# Patient Record
Sex: MALE | Race: WHITE | NOT HISPANIC OR LATINO | Employment: OTHER | URBAN - METROPOLITAN AREA
[De-identification: names, ages, dates, MRNs, and addresses within clinical notes are randomized per-mention and may not be internally consistent; named-entity substitution may affect disease eponyms.]

---

## 2023-02-15 ENCOUNTER — TRANSCRIBE ORDERS (OUTPATIENT)
Dept: ADMINISTRATIVE | Facility: HOSPITAL | Age: 68
End: 2023-02-15
Payer: MEDICARE

## 2023-02-15 ENCOUNTER — HOSPITAL ENCOUNTER (OUTPATIENT)
Dept: GENERAL RADIOLOGY | Facility: HOSPITAL | Age: 68
Discharge: HOME | End: 2023-02-15
Admitting: RADIOLOGY
Payer: MEDICARE

## 2023-02-15 DIAGNOSIS — H44.609 RETAINED MAGNETIC INTRAOCULAR FOREIGN BODY, UNSPECIFIED LATERALITY: Primary | ICD-10-CM

## 2023-02-15 PROCEDURE — 70140 X-RAY EXAM OF FACIAL BONES: CPT

## 2023-03-13 ENCOUNTER — OFFICE VISIT (OUTPATIENT)
Dept: NEUROSURGERY | Facility: CLINIC | Age: 68
End: 2023-03-13
Payer: MEDICARE

## 2023-03-13 VITALS
DIASTOLIC BLOOD PRESSURE: 84 MMHG | BODY MASS INDEX: 24.05 KG/M2 | SYSTOLIC BLOOD PRESSURE: 130 MMHG | HEIGHT: 71 IN | WEIGHT: 171.8 LBS | TEMPERATURE: 97.1 F

## 2023-03-13 DIAGNOSIS — M43.16 SPONDYLOLISTHESIS OF LUMBAR REGION: ICD-10-CM

## 2023-03-13 DIAGNOSIS — M51.36 DDD (DEGENERATIVE DISC DISEASE), LUMBAR: ICD-10-CM

## 2023-03-13 DIAGNOSIS — R29.2 HYPERREFLEXIA: ICD-10-CM

## 2023-03-13 DIAGNOSIS — M48.062 SPINAL STENOSIS OF LUMBAR REGION WITH NEUROGENIC CLAUDICATION: Primary | ICD-10-CM

## 2023-03-13 PROBLEM — Z12.5 SCREENING FOR PROSTATE CANCER: Status: ACTIVE | Noted: 2023-03-13

## 2023-03-13 PROBLEM — R73.01 IFG (IMPAIRED FASTING GLUCOSE): Status: ACTIVE | Noted: 2023-03-13

## 2023-03-13 PROBLEM — M17.0 OSTEOARTHRITIS OF BOTH KNEES: Status: ACTIVE | Noted: 2023-03-13

## 2023-03-13 PROBLEM — G45.9 TRANSIENT ISCHEMIC ATTACK: Status: ACTIVE | Noted: 2023-03-13

## 2023-03-13 PROBLEM — I25.10 CORONARY ARTERY DISEASE: Status: ACTIVE | Noted: 2023-03-13

## 2023-03-13 PROBLEM — Z87.19 HISTORY OF UPPER GASTROINTESTINAL BLEEDING: Status: ACTIVE | Noted: 2023-03-13

## 2023-03-13 PROBLEM — I10 HYPERTENSION, BENIGN: Status: ACTIVE | Noted: 2023-03-13

## 2023-03-13 PROBLEM — M54.9 BACK PAIN WITH RADIATION: Status: ACTIVE | Noted: 2023-03-13

## 2023-03-13 PROBLEM — K21.9 GASTROESOPHAGEAL REFLUX DISEASE WITHOUT ESOPHAGITIS: Status: ACTIVE | Noted: 2023-03-13

## 2023-03-13 PROBLEM — M19.90 CHRONIC OSTEOARTHRITIS: Status: ACTIVE | Noted: 2023-03-13

## 2023-03-13 PROBLEM — K25.4 CHRONIC GASTRIC ULCER WITH HEMORRHAGE: Status: ACTIVE | Noted: 2023-03-13

## 2023-03-13 PROBLEM — Z72.0 TOBACCO USER: Status: ACTIVE | Noted: 2023-03-13

## 2023-03-13 PROBLEM — R45.4 IRRITABILITY AND ANGER: Status: ACTIVE | Noted: 2023-03-13

## 2023-03-13 PROBLEM — E78.5 HYPERLIPIDEMIA: Status: ACTIVE | Noted: 2023-03-13

## 2023-03-13 PROBLEM — M48.061 LUMBAR SPINAL STENOSIS: Status: ACTIVE | Noted: 2023-03-13

## 2023-03-13 PROBLEM — Z23 NEED FOR VACCINATION AGAINST STREPTOCOCCUS PNEUMONIAE: Status: ACTIVE | Noted: 2023-03-13

## 2023-03-13 PROBLEM — Z86.73 HISTORY OF TIA (TRANSIENT ISCHEMIC ATTACK): Status: ACTIVE | Noted: 2023-03-13

## 2023-03-13 PROCEDURE — 99204 OFFICE O/P NEW MOD 45 MIN: CPT | Performed by: NEUROLOGICAL SURGERY

## 2023-03-13 RX ORDER — EZETIMIBE 10 MG/1
TABLET ORAL
COMMUNITY
Start: 2023-02-25

## 2023-03-13 RX ORDER — ATORVASTATIN CALCIUM 40 MG/1
TABLET, FILM COATED ORAL
COMMUNITY
End: 2023-04-04

## 2023-03-13 RX ORDER — CLOPIDOGREL BISULFATE 75 MG/1
TABLET ORAL
COMMUNITY
Start: 2023-02-25

## 2023-03-13 RX ORDER — ASPIRIN 81 MG/1
TABLET ORAL EVERY 24 HOURS
COMMUNITY

## 2023-03-13 RX ORDER — SENNOSIDES 8.6 MG
CAPSULE ORAL
COMMUNITY
Start: 2023-01-17

## 2023-03-13 RX ORDER — UBIDECARENONE 100 MG
CAPSULE ORAL DAILY
COMMUNITY

## 2023-03-13 RX ORDER — IRBESARTAN 300 MG/1
TABLET ORAL
COMMUNITY
Start: 2005-03-15

## 2023-03-13 NOTE — PROGRESS NOTES
NAME: BRYSON SOSA   DOS: 3/13/2023  : 1955  PCP: Provider, No Known    Chief Complaint:    Chief Complaint   Patient presents with   • Low back & bilateral leg pain       History of Present Illness:  68 y.o. male   Is a 68-year-old male in neurosurgical consultation presents with a longstanding history of back issues he reports about 10 years ago a history of back pain and sciatica that was treated with injections he was told that he was not a surgical candidate    He works for [employer] has moved about and recently been in in [place] for the last 6 years or so most recently his back has been tolerable and he has had intermittent back pain but up until about November or December of this year he had significant worsening of his back pain associated with some left paraspinal discomfort as well as occasional bilateral claudication involving his calves occasional left ankle in his hip joints.  He feels like something is off he denies any bowel bladder continence issues she has a significant coronary disease history and has had a stent placed and is on Plavix and aspirin he has an upcoming trip and is here for evaluation he denies upper extremity symptoms he does occasionally have neck pain    PMHX  Allergies:  No Known Allergies  Medications    Current Outpatient Medications:   •  acetaminophen (TYLENOL) 650 MG 8 hr tablet, Take  by mouth., Disp: , Rfl:   •  irbesartan (AVAPRO) 300 MG tablet, irbesartan 300 mg tablet, Disp: , Rfl:   •  aspirin 81 MG EC tablet, Daily., Disp: , Rfl:   •  atorvastatin (Lipitor) 40 MG tablet, Lipitor 40 mg tablet, Disp: , Rfl:   •  B Complex-Folic Acid (Super B Complex Maxi) tablet, Take  by mouth Daily., Disp: , Rfl:   •  Cholecalciferol 125 MCG (5000 UT) tablet, Take  by mouth Daily., Disp: , Rfl:   •  clopidogrel (PLAVIX) 75 MG tablet, , Disp: , Rfl:   •  coenzyme Q10 100 MG capsule, Take  by mouth Daily., Disp: , Rfl:   •  ezetimibe (ZETIA) 10 MG tablet, , Disp: , Rfl:    •  Glucosamine-Vitamin D (Glucosamine Plus Vitamin D) 1500-200 MG-UNIT tablet, Take  by mouth Daily., Disp: , Rfl:   Past Medical History:  Past Medical History:   Diagnosis Date   • Arthritis 2017   • Clotting disorder (HCC) 2005   • Coronary artery disease 2005   • Hyperlipidemia 2005   • Hypertension 2005   • Infectious viral hepatitis childhood   • Low back pain 2005   • Lumbosacral disc disease 2012   • Pulmonary arterial hypertension (HCC) 2005   • TIA (transient ischemic attack) 2006     Past Surgical History:  Past Surgical History:   Procedure Laterality Date   • CAROTID STENT  2005   • TRIGGER POINT INJECTION  3  steroid shots 2012     Social Hx:  Social History     Tobacco Use   • Smoking status: Former     Types: Cigars   Substance Use Topics   • Alcohol use: Yes     Alcohol/week: 14.0 standard drinks     Types: 14 Cans of beer per week   • Drug use: Never     Family Hx:  Family History   Problem Relation Age of Onset   • Cancer Mother         skin   • Hyperlipidemia Mother    • Hearing loss Father    • Heart disease Father    • Hyperlipidemia Father    • Kidney disease Father      Review of Systems:        Review of Systems   Constitutional: Positive for activity change. Negative for appetite change, chills, diaphoresis, fatigue, fever and unexpected weight change.   HENT: Negative for congestion, dental problem, drooling, ear discharge, ear pain, facial swelling, hearing loss, mouth sores, nosebleeds, postnasal drip, rhinorrhea, sinus pressure, sinus pain, sneezing, sore throat, tinnitus, trouble swallowing and voice change.    Eyes: Negative for photophobia, pain, discharge, redness, itching and visual disturbance.   Respiratory: Negative for apnea, cough, choking, chest tightness, shortness of breath, wheezing and stridor.    Cardiovascular: Negative for chest pain, palpitations and leg swelling.   Gastrointestinal: Negative for abdominal distention, abdominal pain, anal bleeding, blood in stool,  constipation, diarrhea, nausea, rectal pain and vomiting.   Endocrine: Negative for cold intolerance, heat intolerance, polydipsia, polyphagia and polyuria.   Genitourinary: Negative for decreased urine volume, difficulty urinating, dysuria, enuresis, flank pain, frequency, genital sores, hematuria, penile discharge, penile pain, penile swelling, scrotal swelling, testicular pain and urgency.   Musculoskeletal: Positive for back pain. Negative for arthralgias, gait problem, joint swelling, myalgias, neck pain and neck stiffness.   Skin: Negative for color change, pallor, rash and wound.   Allergic/Immunologic: Negative for environmental allergies, food allergies and immunocompromised state.   Neurological: Positive for light-headedness and numbness. Negative for dizziness, tremors, seizures, syncope, facial asymmetry, speech difficulty, weakness and headaches.   Hematological: Negative for adenopathy. Bruises/bleeds easily.   Psychiatric/Behavioral: Negative for agitation, behavioral problems, confusion, decreased concentration, dysphoric mood, hallucinations, self-injury, sleep disturbance and suicidal ideas. The patient is not nervous/anxious and is not hyperactive.         I have reviewed this note template and all pertinent parts of the review of systems social, family history, surgical history and medication list    Physical Examination:  Vitals:    03/13/23 1308   BP: 130/84   Temp: 97.1 °F (36.2 °C)      General Appearance:   Well developed, well nourished, well groomed, alert, and cooperative.  Neurological examination:  Neurologic Exam  Vital signs were reviewed and documented in the chart  Patient appeared in good neurologic function with normal comprehension fluent speech  Mood and affect are normal  Sense of smell deferred  COVID mass left in place    Muscle bulk and tone mostly symmetric he may have a little bit of left quadricep atrophy compared to the right  5 out of 5 strength no motor drift  Gait  normal intact  Negative Romberg  No clonus long tract signs or myelopathy    Reflexes he is markedly hyperreflexic with a trace Efrain's on the left  No edema noted and extremities skin appears normal  He has a lean legs  Straight leg raise sign absent  No signs of intrinsic hip dysfunction  Back is without any lesions or abnormality  Feet are warm and well perfused  Decreased vibratory sensation bilaterally      Review of Imaging/DATA:  I reviewed an MRI of his lumbar spine he is got L2 through anterolisthesis with facet arthropathy and bilateral pars spondylolysis possible there is no evidence of STIR signal change there is mild central canal stenosis    He has at L4-5 L5-S1 bilateral degenerative changes with a mild anterolisthesis with intraforaminal disc disease predominantly at the L4-5 L5-S1  Diagnoses/Plan:    Mr. Simons is a 68 y.o. male   1.  Chronic axial spinal pain amenable to conservative therapy  2.  Worsening pain in December associate with neurogenic claudication calf pain bilaterally bilateral hip and worsening back pain he has L2-3 spondylolysis and L L4-5 predominantly the lateral recess arthritic symptoms  3.  Incidental high-grade hyperreflexia  I explained the risk benefits and expected outcome of major elective surgery for their problem, complications from approach, and infection, the risk of neurologic implications after surgery as well as need for repeat surgeries and most importantly failure to achieve quality of life improvement from the surgery to the patient.    I talked about the pathophysiology of his back and back pain is difficult to ascertain I see nothing in the central spinal canal that should make me of concern other than his hyperreflexia    From a neurosurgical standpoint I think it is reasonable to get:  Bilateral hip x-rays  A lumbar myelogram to see what he is doing under axial loading this will also allow for a post CT that will tell me whether or not he is got pars  fractures or not and checked the degree of ossification  I do think it is also reasonable to get a cervical spinal MRI given his degree of hyperreflexia with decreased vibratory sensation    I will make a referral to interventional pain management and have instructed him that if his pain got markedly better with an injection we can defer further work-up given the complexities of the nonadjacent level disease obviously would like to manage this conservatively and I explained that flareups typically can get better without surgery    That being said I be happy to see him back after the myelogram for further discussions of surgery    He has an upcoming trip we will wish him luck: He asked me about ongoing pain management  That really there is not a simple fix he is currently on tramadol I recommended that along with ibuprofen Tylenol heat massage therapy etc. we will try to get him into the interventional pain management doctors as soon as possible

## 2023-03-13 NOTE — PATIENT INSTRUCTIONS
Recommendation from visit today with Dr. Ferraro:  4-6 weeks of Physical Therapy  Appointment with a Pain Management Specialist to discuss non surgical pain management.   Myelogram procedure (Vikki in our office will call you to schedule this-If injection helps, don't go through with procedure.)  Nerve conduction study of your legs (Vikki in our office will call you to schedule this- If injection helps, don't go through with procedure.)   MRI of your neck   We do not prescribe narcotic based pain medication, you will need to speak with your family doctor.

## 2023-04-03 NOTE — PROGRESS NOTES
"Chief Complaint: \"Pain in my lower back and legs\"         History of Present Illness:   Patient: Mr. Levi Simons, 68 y.o. male   Referring Physician: Dr. Stephen Ferraro  Reason for Referral: Consultation for chronic intractable lower back and lower extremity pain.   Pain History: Patient reports a longstanding history of at least 10 years of chronic progressive lower back and lower extremity pain, which began without incident.  Patient reports that he has been treated with conservative measures including oral analgesics, tramadol, massage therapy, physical therapy without significant relief.  He was also treated around 2012 with \"3 lumbar injections\" by a  In [place] with relief for about 10 years (he still had low grade lower back pain). He was told by Dr. Moore (neurosurgeon) that he was not a surgical candidate.  More recently, 11/2022 he started feeling pain in his calves. In 12/2022, he also started feeling pain in his tailbone. Patient reports that his pain increased significantly and it became unbearable.  He saw Dr. Dominik Coleman, PCP. Pain continued to increase. In early January, an APRN (PCP) prescribed baclofen, prednisone and PT. On 1/17/2023, he saw Dr. Coleman for follow up, who ordered more PT. On 02/15/2023 he underwent a lumbar MRI of the lumbar spine on 02/15/2023, which revealed a transitional lumbosacral vertebrae with sacralization of L5. Multilevel degenerative changes with various degrees of multilevel canal, lateral recess and neuroforaminal stenosis predominantly at L4-L5 and L5-S1.  At L2-L3: Bilateral spondylolysis of L2 associated with grade 1 anterolisthesis of L2 on L3. Modic type II endplate changes.  Posterior superior disc pseudo extrusion and posterior annular fissure. Mild canal stenosis and moderate neuroforaminal stenosis. At L4-L5: Facet hypertrophy, ligamentum flavum hypertrophy, disc desiccation with a annular fissure. Grade 1 anterolisthesis. Moderate central canal " stenosis, severe lateral recess stenosis with effacement of the L5 nerve roots. Mild to moderate left and moderate right neuroforaminal stenosis with effacement of the right L4 nerve root. MRI of the cervical spine without contrast on 04/03/2023 revealed diffuse cervical spondylosis with disc osteophyte complexes, mild listhesis, and facet hypertrophy contributing to multilevel foraminal stenosis but without significant canal stenosis. Levi Simons underwent neurosurgical consultation with Dr. Stephen Ferraro on 03/13/2023 and was found not to be a surgical candidate. Dr. Ferraro explained that there is not a simple fix to patient's multilevel issues. Patient is still participating in PT at [facility]. He complains of back pain with radiation into the hip joints and the calves and ankles.  He denies any new bladder or bowel problems.  He presents with significant comorbidities particularly coronary artery disease s/p stent placement, on Plavix and aspirin. Pain has progressed in intensity over the past months. Patient has failed to obtain pain relief with conservative measures for more than 3 months including oral analgesics, topical analgesics, ice, heat, TENs, physical therapy (ongoing, [facility]), physical therapist directed home exercise program HEP (ongoing), independent exercise program (walking for fitness), to name a few.  Pain Description: Constant lower back pain with intermittent exacerbation, described as aching, burning, dull, sharp, shooting, stabbing and throbbing sensation.   Radiation of Pain: The pain radiates from the back to the gluteal region and into his calves  Pain intensity today: 5/10   Average pain intensity last week: 9/10  Pain intensity ranges from: 1/10 to 9/10  Aggravating factors: Pain increases with bending, standing, walking. Patient denies neurogenic claudication. Patient does not use a cane or walker  Alleviating factors: Pain decreases with sitting down, lying down, heating  "pads  Associated Symptoms:   Patient reports pain, numbness, and weakness in the lower extremities.   Patient denies any new bladder or bowel problems.   Patient denies difficulties with his balance or recent falls.   Pain does not interfere with ADLs, but interferes with general activities and affects patient's quality of life  Pain does not interfere with sleep causing sleep fragmentation   Stiffness: Lower back    Review of previous therapies and additional medical records:  Levi Simons has already failed the following measures, including:   Conservative Measures: Oral analgesics, topical analgesics, ice, heat, TENs, physical therapy (ongoing, [facility]), physical therapist directed home exercise program HEP (ongoing), independent exercise program (walking for fitness), to name a few  Interventional Measures: In 2012 with \"3 lumbar injections\" by amrit Swan in [place] with significant relief for about 10 years  Surgical Measures: No history of previous cervical spine, lumbar spine or hip surgery   Levi Simons underwent neurosurgical consultation with Dr. Stephen Ferraro on 3/13/2023 and was found not to be a surgical candidate.  Levi Simons presents with significant comorbidities including coronary artery disease s/p stent placement, on aspirin and Plavix, hyperlipidemia, hypertension, history of TIA, GERD, tobacco user, irritability and anger  In terms of current analgesics, Lvei Simons takes: Tylenol 650 mg 3 times daily as needed, baclofen 10 mg BID, ibuprofen 800 mg BID, tramadol 50 mg TID  I have reviewed Jeff Report consistent with medication reconciliation.  SOAPP: Low Risk     PHQ-2 Depression Screening  Little interest or pleasure in doing things? 0-->not at all   Feeling down, depressed, or hopeless? 0-->not at all   PHQ-2 Total Score 0   Patient screened negative for depression based on a PHQ-2 score of 0 on 04/04/2023    Global Pain Scale 04-04 2023          Pain 16.5          Feelings " 2          Clinical outcomes 8.5          Activities 2.5          GPS Total: 21            The [place] Back Pain Disability Scale   DATE 04-04 2023          Sleep through the night 2          Turn over in bed 1          Get out of bed 1          Make your bed 1          Put on socks (pantyhose) 1          Ride in a car 3          Sit in a chair for several hours 3          Stand up for 20-30 minutes 0          Climb one flight of stairs 0          Walk a few blocks (200-300 yards)  1          Walk several miles 1          Run one block (about 50 yards) 3          Take food out of the refrigerator 0          Reach up to high shelves 0          Move a chair 1          Pull or push heavy doors 1          Bend over to clean the bathtub 3          Throw a ball 1          Carry two bags of groceries 1          Lift and carry a heavy suitcase 3          Total score 27            Review of Diagnostic Studies:  I have reviewed and interpreted the images with the patient and used the images and a tridimensional spine model to explain findings. I have reviewed the report, as well.  MRI of the cervical spine without contrast on 4/3/2023 revealed diffuse cervical spondylosis.  Axial imaging:  C2-C3: Mild to moderate left foraminal stenosis  C3-C4: Disc osteophyte complex, facet hypertrophy.  Modic type I changes.  Severe left foraminal stenosis.  C4-C5: There is osteophyte complex, facet hypertrophy.  Severe left foraminal stenosis  C5-C6: 2 mm retrolisthesis, Modic type I endplate changes.  Disc osteophyte complex with mild right cord abutment.  Severe right foraminal stenosis.  Mild left foraminal stenosis  C6-C7: Facet hypertrophy, disc osteophyte complex, Modic type I changes.  Mild central canal stenosis.  Moderate to severe bilateral foraminal stenosis  C7-T1: 3 mm anterolisthesis.  MRI of the lumbar spine without contrast on 2/15/2023 revealed a transitional lumbosacral vertebrae with sacralization of L5.  Mild  dextroscoliosis with apex at L2-L3.  The conus is seen at T12 and has unremarkable appearance.  Axial imaging:  T12-L1, L1-L2: No significant canal or foraminal stenosis  L2-L3: Bilateral spondylolysis of L2 associated with grade 1 anterolisthesis of L2 on L3.  Modic type II endplate changes.  Posterior superior disc pseudo extrusion and posterior annular fissure.  Mild canal stenosis and mild to moderate neuroforaminal stenosis  L3-L4: The pedicles are shorter than average.  Slight retrolisthesis.  Disc bulge.  Facet hypertrophy.  Mild canal stenosis and mild foraminal stenosis  L4-L5: Facet hypertrophy, ligamentum flavum hypertrophy, disc desiccation with a annular fissure.  Grade 1 anterolisthesis.  Moderate central canal stenosis, moderate to severe lateral recess stenosis with effacement of the L5 nerve roots.  Mild to moderate left and moderate right neuroforaminal stenosis with effacement of the right L4 nerve root  L5-S1: L5 is partially sacralized.  Rudimentary disc space.    Review of Systems   Constitutional: Positive for activity change.   HENT: Positive for rhinorrhea and tinnitus.    Gastrointestinal: Positive for abdominal pain.   Musculoskeletal: Positive for arthralgias, back pain, gait problem and myalgias.   Neurological: Positive for headaches.   Hematological: Bruises/bleeds easily.         Patient Active Problem List   Diagnosis   • Back pain with radiation   • Body mass index (BMI) of 23.0-23.9 in adult   • Chronic gastric ulcer with hemorrhage   • Chronic osteoarthritis   • Coronary artery disease   • Gastroesophageal reflux disease without esophagitis   • History of TIA (transient ischemic attack)   • History of upper gastrointestinal bleeding   • Hyperlipidemia   • Hypertension, benign   • IFG (impaired fasting glucose)   • Irritability and anger   • Bilateral lumbar radiculopathy   • Need for vaccination against Streptococcus pneumoniae   • Osteoarthritis of both knees   • Screening for  prostate cancer   • Transient ischemic attack   • 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   • DDD (degenerative disc disease), cervical   • Cervical spondylosis without myelopathy   • Spondylosis of lumbar region without myelopathy or radiculopathy   • Degeneration of lumbar or lumbosacral intervertebral disc   • Spondylolisthesis of lumbar region   • Lumbar stenosis with neurogenic claudication   • History of coronary artery stent placement   • Chronic anticoagulation       Past Medical History:   Diagnosis Date   • Arthritis 2017   • Chronic pain disorder 2022   • Clotting disorder 2005   • Coronary artery disease 2005   • DDD (degenerative disc disease), cervical 4/4/2023   • Hyperlipidemia 2005   • Hypertension 2005   • Infectious viral hepatitis childhood   • Low back pain 2005   • Lumbosacral disc disease 2012   • Pulmonary arterial hypertension 2005   • TIA (transient ischemic attack) 2006         Past Surgical History:   Procedure Laterality Date   • CAROTID STENT  2005   • OTHER SURGICAL HISTORY  2011    upper gi bleed cautery   • TONSILLECTOMY  1963   • TRIGGER POINT INJECTION  3  steroid shots 2012         Family History   Problem Relation Age of Onset   • Cancer Mother         skin   • Hearing loss Father    • Heart disease Father    • Kidney disease Father          Social History     Socioeconomic History   • Marital status:    Tobacco Use   • Smoking status: Former     Types: Cigars   Substance and Sexual Activity   • Alcohol use: Yes     Alcohol/week: 14.0 standard drinks     Types: 14 Cans of beer per week   • Drug use: Never   • Sexual activity: Not Currently           Current Outpatient Medications:   •  acetaminophen (TYLENOL) 650 MG 8 hr tablet, Take  by mouth., Disp: , Rfl:   •  aspirin 81 MG EC tablet, Daily., Disp: , Rfl:   •  B Complex-Folic Acid (Super B Complex Maxi) tablet, Take  by mouth Daily., Disp: , Rfl:   •  baclofen (LIORESAL) 10 MG tablet, Take 1 tablet  "by mouth 3 (Three) Times a Day., Disp: , Rfl:   •  Cholecalciferol 125 MCG (5000 UT) tablet, Take  by mouth Daily., Disp: , Rfl:   •  clopidogrel (PLAVIX) 75 MG tablet, , Disp: , Rfl:   •  coenzyme Q10 100 MG capsule, Take  by mouth Daily., Disp: , Rfl:   •  ezetimibe (ZETIA) 10 MG tablet, , Disp: , Rfl:   •  Glucosamine-Vitamin D (Glucosamine Plus Vitamin D) 1500-200 MG-UNIT tablet, Take  by mouth Daily., Disp: , Rfl:   •  ibuprofen (ADVIL,MOTRIN) 800 MG tablet, Take 1 tablet by mouth Every 6 (Six) Hours As Needed for Mild Pain., Disp: , Rfl:   •  irbesartan (AVAPRO) 300 MG tablet, irbesartan 300 mg tablet, Disp: , Rfl:   •  methylcellulose, Laxative, (CITRUCEL) 500 MG tablet tablet, Take 2 tablets by mouth Every 4 (Four) Hours As Needed., Disp: , Rfl:   •  metoprolol succinate XL (TOPROL-XL) 50 MG 24 hr tablet, Take 1 tablet by mouth Daily., Disp: , Rfl:   •  Omega-3 Fatty Acids (fish oil) 1000 MG capsule capsule, Take  by mouth Daily With Breakfast., Disp: , Rfl:   •  rosuvastatin (CRESTOR) 40 MG tablet, Take 1 tablet by mouth Daily., Disp: , Rfl:   •  traMADol (ULTRAM) 50 MG tablet, Take  by mouth., Disp: , Rfl:       No Known Allergies      /80   Pulse 86   Resp 14   Ht 180.3 cm (71\")   Wt 75.5 kg (166 lb 6.4 oz)   SpO2 99%   BMI 23.21 kg/m²       Physical Exam:  Constitutional: Patient appears well-developed, well-nourished, well-hydrated, appears younger than stated age  HEENT: Head: Normocephalic and atraumatic  Eyes: Conjunctivae and lids are normal  Pupils: Equal, round, reactive to light  Peripheral vascular exam: Femoral: right 2+, left 2+. Posterior tibialis: right 2+ and left 2+. Dorsalis pedis: right 2+ and left 2+. No edema.   Musculoskeletal   Gait and station: Gait evaluation demonstrated a normal gait. Able to walk on heels, toes, tandem walking   Cervical spine: Passive and active range of motion are limited but without pain. Extension, flexion, lateral flexion, rotation of the " cervical spine did not increase or reproduce pain. Cervical facet joint loading maneuvers are negative at this time  Muscles: Presence of active trigger points: None  Shoulders: The range of motion of the glenohumeral joints is slightly limited but without pain. Rotator cuff strength is 5/5.   Lumbar spine: Passive and active range of motion are limited but without significant pain. Extension, flexion, lateral flexion, rotation of the lumbar spine did not increase or reproduce pain. Lumbar facet joint loading maneuvers are negative a this time.  Jhonny test and Gaenslen's test are negative   Piriformis maneuvers are negative   Hip joints: The range of motion of the hip joints is slightly limited but without pain   Palpation of the bilateral greater trochanter, unrevealing   Examination of the Iliotibial band: unrevealing   Neurological:   Patient is alert and oriented to person, place, and time.   Speech: Normal.   Cortical function: Normal mental status.   Cranial nerves 2-12: intact.   Reflex Scores:  Right brachioradialis: 2+  Left brachioradialis: 2+  Right biceps: 2+  Left biceps: 2+  Right triceps: 2+  Left triceps: 2+  Right patellar: 2+  Left patellar: 2+  Right Achilles: 2+  Left Achilles: 2+  Motor strength: 5/5  Motor Tone: Normal  Involuntary movements: None.   Superficial/Primitive Reflexes: Primitive reflexes were absent.   Right Cervantes: Absent  Left Cervantes: Absent  Right ankle clonus: Absent  Left ankle clonus: Absent   Babinsky: Absent  Spurling sign: Negative. Neck tornado test: Negative. Lhermitte sign: Negative. Long tract signs: Negative. Straight leg raising test: Positive, bilaterally. Femoral stretch sign: Positive, bilaterally.   Sensory exam: Intact to light touch, intact pain and temperature sensation, intact vibration sensation and normal proprioception.   Coordination: Normal finger to nose and heel to shin. Normal balance. Romberg's sign: Negative   Skin and subcutaneous tissue: Skin  "is warm and intact. No rash noted. No cyanosis.   Psychiatric: Judgment and insight: Normal. Recent and remote memory: Intact. Mood and affect: Normal.     ASSESSMENT:   1. Lumbar stenosis with neurogenic claudication    2. Spondylolisthesis of lumbar region    3. Degeneration of lumbar or lumbosacral intervertebral disc    4. Spondylosis of lumbar region without myelopathy or radiculopathy    5. Cervical spondylosis without myelopathy    6. DDD (degenerative disc disease), cervical    7. 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 History of coronary artery stent placement    9. Chronic anticoagulation        PLAN/MEDICAL DECISION MAKING:  Mr. Levi Simons, 68 y.o. male presents with a longstanding history of at least 10 years of chronic progressive lower back and lower extremity pain, which began without incident. Patient reports that he has been treated with conservative measures in the past including oral analgesics, tramadol, massage therapy, physical therapy without significant relief. He was also treated around 2012 with \"3 lumbar injections\" by a  In [place] with relief for about 10 years (he continued experiencing low grade lower back pain). He was told by Dr. Moore (neurosurgeon in [place]) that he was not a surgical candidate. More recently, in 11/2022, he started feeling pain in his calves. In 12/2022, he also started feeling pain in his tailbone. Patient reports that his pain increased significantly and it became unbearable. He saw Dr. Dominik Coleman, PCP. Pain continued to increase. In early January, an APRN (PCP) prescribed baclofen, prednisone and PT. On 1/17/2023, he saw Dr. Coleman again for follow up, who ordered more PT and a lumbar MRI. On 02/15/2023, he underwent MRI of the lumbar spine, which revealed transitional lumbosacral vertebrae with sacralization of L5. Multilevel degenerative changes with various degrees of multilevel canal, lateral recess and neuroforaminal " stenosis predominantly at L4-L5 and L5-S1.  At L2-L3: Bilateral spondylolysis of L2 associated with grade 1 anterolisthesis of L2 on L3. Modic type II endplate changes.  Posterior superior disc pseudo extrusion and posterior annular fissure. Mild canal stenosis and moderate neuroforaminal stenosis. At L4-L5: Facet hypertrophy, ligamentum flavum hypertrophy, disc desiccation with a annular fissure. Grade 1 anterolisthesis. Moderate central canal stenosis, severe lateral recess stenosis with effacement of the L5 nerve roots. Mild to moderate left and moderate right neuroforaminal stenosis with effacement of the right L4 nerve root. MRI of the cervical spine without contrast on 04/03/2023 revealed diffuse cervical spondylosis with disc osteophyte complexes, mild listhesis, and facet hypertrophy contributing to multilevel foraminal stenosis but without significant canal stenosis. Levi Simons underwent neurosurgical consultation with Dr. Stephen Ferraro on 03/13/2023 and was found not to be a surgical candidate. Dr. Ferraro explained that there is not a simple fix to patient's multilevel issues. Patient is still participating in PT at [facility]. He complains of back pain with radiation into the hip joints and into his calves and ankles. He denies new bladder or bowel problems. 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 He does not exhibit any signs or symptoms of cervical myelopathy at this time. He presents with significant comorbidities particularly coronary artery disease s/p stent placement, on Plavix and aspirin. Patient has failed to obtain pain relief with conservative measures for more than 3 months including oral analgesics, topical analgesics, ice, heat, TENs, physical therapy (ongoing, [facility]), physical therapist directed home exercise program HEP (ongoing), independent exercise program (walking for fitness), to name a few. Pain has progressed in intensity over the past months. There is clinical and  radiological correlation with the findings at the L4-L5 explaining bilateral L5 radiculopathies.  Upon physical examination, I have concerns that he could also potentially be at some point symptomatic from a stenosis of the L2-L3.  Examination of the cervical spine was benign revealing a decreased range of motion with minimal facetogenic pain and negative findings for myelopathy or cervical radiculopathy, at this time.  He does have a slight degree of hyperreflexia which is symmetrical in all reflexes tested.  He does not have any significant gait abnormality, balance difficulties, bladder or bowel issues, or any other signs or symptoms.  A comprehensive evaluation including history and physical exam along with pertinent physiologic and functional assessment was performed. Patient presents with intractable pain due to the diagnoses listed above. Patient has failed to respond to conservative modalities, as referenced under HPI including the impact of patient's moderate-to-severe pain contributing to significant impairment in daily activities, ADLs, and a negative impact on quality of life. Supporting diagnostic studies of patient's chronic pain condition have been reviewed. I have reviewed all available patient's medical records as well as previous therapies as referenced above. I had a lengthy conversation with . Levi Simons regarding his chronic pain condition and potential therapeutic options including risks, benefits, alternative therapies, to name a few. We have discussed using a stepwise approach starting with the shortest or least intense level of treatment, care, or service as determined by the extent required to diagnose and or treat patient's condition. The treatments proposed are consistent with the patient's medical condition and are known to be as safe and effective by current guidelines and standard of care. There is no evidence of absolute contraindications for the proposed procedures under the  current circumstances. These treatments are not considered experimental or investigational. The duration and frequency proposed are considered appropriate for the service in accordance with accepted standards of medical practice for the diagnosis and or treatment of the patient's condition and or intended to improve the patient's level of function. These services will be furnished in a setting appropriate to the patient's medical needs and condition. Therefore, I have proposed the following plan:  1. Interventional pain management measures: Patient will need to stop clopidogrel (Plavix) at least 7 days between last dose and procedure (Dr. Durham). Patient will be scheduled for diagnostic and therapeutic bilateral L4-L5 transforaminal epidural steroid injections using the lowest effective dose of steroids, under C-arm fluoroscopic guidance, with the use of contrast dye (unless contraindicated) to confirm appropriate needle placement and spread of contrast dye. We may repeat therapeutic bilateral L4-L5 transforaminal epidural steroid injections depending on patient's outcome.  As per current guidelines, epidurals will be limited to a maximum of 4 sessions per spinal region in a rolling twelve (12) month period. Continuation of epidural steroid injections over 12 months would only be considered under the following provisions;  • Patient is a high-risk surgical candidate, or the patient does not desire surgery, or recurrence of pain in the same location relieved with ESIs for at least three months and epidural provides at least 50% sustained improvement of pain and/or 50% objective improvement in function (using same scale as baseline)  • Pain is severe enough to cause a significant degree of functional disability or vocational disability  • The primary care provider will be notified regarding continuation of procedures and repeat steroid use   Patient is not interested in surgical options.  In addition, he has been  found not to be a surgical candidate at this time.  In the event of surgery, he will be required to complete lumbar myelogram with CT post myelogram along with electrodiagnostic studies.  Therefore, we may proceed with a spinal cord stimulator trial with [employer], [employer] or [employer]. Patient has received formal education from me including risks, benefits, and alternative treatments, as well as detailed information regarding the procedure and specific goals for the trial. Patient has also received didactic materials including educational booklets and DVDs on spinal cord stimulation therapies.  2. Diagnostic studies:  A. Flexion and extension X-rays of the cervical spine to assess cervical stability  B. Flexion and extension X-rays of the lumbar spine to assess lumbar stability  C. EMG/NCV of the bilateral lower extremities   D. MRI of the thoracic spine without contrast to assess capacity and patency of the spinal canal and epidural space prior to spinal cord stimulator trial and implant  E. CBC, PT, PTT prior to SCS trial  3. Pharmacological measures: Reviewed and discussed;   A. Patient takes Tylenol 650 mg 3 times daily as needed, baclofen 10 mg BID, ibuprofen 800 mg BID, tramadol 50 mg TID  B. Trial with Rheumate one tablet once daily  C. Start pyridoxine 100 mg one tablet by mouth daily take for 30 days, #30, no refills  D. Start alpha lipoid acid 1589-9036 mg per day divided into 3 doses  E. Trial with prilocaine 2%, lidocaine 10%, imipramine 3%, capsaicin 0.001% and mannitol 20% cream, apply 1 to 2 grams of cream to the affected areas every 4 to 6 hours as needed  4. Long-term rehabilitation efforts:  A. The patient does not have a history of falls. I did complete a risk assessment for falls.   B. Patient will start a comprehensive physical therapy program for Alter-G, core strengthening, gait and balance training, neurodynamics, ASTYM, E-STIM, myofascial release, cupping, dry needling, home exercise  program  C. Start an exercise program such as water therapy and swimming  D. Referral to [facility] for Pilates  E. Trial with TENS unit/interferential unit  F. Contrast therapy: Apply ice-packs for 15-20 minutes, followed by heating pads for 15-20 minutes to affected area   G. Patient will need a referral to Dr. Christos Wiseman for psychological screening prior to consideration of spinal cord stimulation and intrathecal therapies.  H. Levi Simons  reports that he has quit smoking. His smoking use included cigars. He does not have any smokeless tobacco history on file.   5. The patient has been instructed to contact my office with any questions or difficulties. The patient understands the plan and agrees to proceed accordingly.    The patient has a documented plan of care to address chronic pain. Levi Simons reports a pain score of  5/10.  Given his pain assessment as noted, treatment options were discussed and the following options were decided upon as a follow-up plan to address the patient's pain: continuation of current treatment plan for pain, educational materials on pain management, home exercises and therapy, prescription for non-opiod analgesics, referral to Physical Therapy, referral to specialist for assistance in pain treatment guidance, steroid injections, use of non-medical modalities (ice, heat, stretching and/or behavior modifications) and Interventional pain management measures including neuromodulation techniques.              Pain Management Panel    There is no flowsheet data to display.        JAYDE query complete. JAYDE reviewed by Ramiro Tomlinson MD.     Pain Medications             acetaminophen (TYLENOL) 650 MG 8 hr tablet Take  by mouth.    aspirin 81 MG EC tablet Daily.    baclofen (LIORESAL) 10 MG tablet Take 1 tablet by mouth 3 (Three) Times a Day.    ibuprofen (ADVIL,MOTRIN) 800 MG tablet Take 1 tablet by mouth Every 6 (Six) Hours As Needed for Mild Pain.    traMADol  (ULTRAM) 50 MG tablet Take  by mouth.           No orders of the defined types were placed in this encounter.     Time spent reviewing diagnostic studies, consultation reports, previous treatments, pre-chartin minutes  Time spent face-to-face with the patient:  95 minutes  Time spent ordering diagnostic studies, referrals, and writing this note: 24 minutes  Total Time: 125 minutes    Please note that portions of this note were completed with a voice recognition program.     Ramiro Tomlinson MD    Patient Care Team:  Darron Coleman MD as PCP - General (Family Medicine)  Ramiro Tomlinson MD as Consulting Physician (Pain Medicine)     No orders of the defined types were placed in this encounter.        Future Appointments   Date Time Provider Department [place]   2023 10:45 AM Ramiro Tomlinson MD [facility]

## 2023-04-04 ENCOUNTER — HOSPITAL ENCOUNTER (OUTPATIENT)
Dept: GENERAL RADIOLOGY | Facility: HOSPITAL | Age: 68
Discharge: HOME | End: 2023-04-04
Payer: MEDICARE

## 2023-04-04 ENCOUNTER — OFFICE VISIT (OUTPATIENT)
Dept: PAIN MEDICINE | Facility: CLINIC | Age: 68
End: 2023-04-04
Payer: MEDICARE

## 2023-04-04 VITALS
SYSTOLIC BLOOD PRESSURE: 120 MMHG | OXYGEN SATURATION: 99 % | BODY MASS INDEX: 23.3 KG/M2 | WEIGHT: 166.4 LBS | HEART RATE: 86 BPM | DIASTOLIC BLOOD PRESSURE: 80 MMHG | HEIGHT: 71 IN | RESPIRATION RATE: 14 BRPM

## 2023-04-04 DIAGNOSIS — M51.37 DEGENERATION OF LUMBAR OR LUMBOSACRAL INTERVERTEBRAL DISC: ICD-10-CM

## 2023-04-04 DIAGNOSIS — M48.062 LUMBAR STENOSIS WITH NEUROGENIC CLAUDICATION: ICD-10-CM

## 2023-04-04 DIAGNOSIS — M50.30 DDD (DEGENERATIVE DISC DISEASE), CERVICAL: ICD-10-CM

## 2023-04-04 DIAGNOSIS — Z95.5 HISTORY OF CORONARY ARTERY STENT PLACEMENT: ICD-10-CM

## 2023-04-04 DIAGNOSIS — M47.812 CERVICAL SPONDYLOSIS WITHOUT MYELOPATHY: ICD-10-CM

## 2023-04-04 DIAGNOSIS — Z01.818 PREOP TESTING: ICD-10-CM

## 2023-04-04 DIAGNOSIS — Z79.01 CHRONIC ANTICOAGULATION: ICD-10-CM

## 2023-04-04 DIAGNOSIS — M43.16 SPONDYLOLISTHESIS OF LUMBAR REGION: ICD-10-CM

## 2023-04-04 DIAGNOSIS — M99.41 CONNECTIVE TISSUE STENOSIS OF NEURAL CANAL OF CERVICAL REGION: ICD-10-CM

## 2023-04-04 DIAGNOSIS — M47.816 SPONDYLOSIS OF LUMBAR REGION WITHOUT MYELOPATHY OR RADICULOPATHY: ICD-10-CM

## 2023-04-04 PROBLEM — Z72.0 TOBACCO USER: Status: RESOLVED | Noted: 2023-03-13 | Resolved: 2023-04-04

## 2023-04-04 PROBLEM — M51.379 DEGENERATION OF LUMBAR OR LUMBOSACRAL INTERVERTEBRAL DISC: Status: ACTIVE | Noted: 2023-04-04

## 2023-04-04 PROBLEM — M54.16 BILATERAL LUMBAR RADICULOPATHY: Status: ACTIVE | Noted: 2023-03-13

## 2023-04-04 PROCEDURE — 72040 X-RAY EXAM NECK SPINE 2-3 VW: CPT

## 2023-04-04 PROCEDURE — 1125F AMNT PAIN NOTED PAIN PRSNT: CPT | Performed by: ANESTHESIOLOGY

## 2023-04-04 PROCEDURE — 3074F SYST BP LT 130 MM HG: CPT | Performed by: ANESTHESIOLOGY

## 2023-04-04 PROCEDURE — 99205 OFFICE O/P NEW HI 60 MIN: CPT | Performed by: ANESTHESIOLOGY

## 2023-04-04 PROCEDURE — 72120 X-RAY BEND ONLY L-S SPINE: CPT

## 2023-04-04 PROCEDURE — 1160F RVW MEDS BY RX/DR IN RCRD: CPT | Performed by: ANESTHESIOLOGY

## 2023-04-04 PROCEDURE — 3079F DIAST BP 80-89 MM HG: CPT | Performed by: ANESTHESIOLOGY

## 2023-04-04 PROCEDURE — 1159F MED LIST DOCD IN RCRD: CPT | Performed by: ANESTHESIOLOGY

## 2023-04-04 RX ORDER — ME-TETRAHYDROFOLATE/B12/HRB236 1-1-500 MG
1 CAPSULE ORAL DAILY
Qty: 90 CAPSULE | Refills: 1 | Status: SHIPPED | OUTPATIENT
Start: 2023-04-04

## 2023-04-04 RX ORDER — ST. JOHN'S WORT 300 MG
400 CAPSULE ORAL 3 TIMES DAILY
Qty: 180 CAPSULE | Refills: 5 | Status: SHIPPED | OUTPATIENT
Start: 2023-04-04

## 2023-04-04 RX ORDER — IBUPROFEN 800 MG/1
800 TABLET ORAL EVERY 6 HOURS PRN
COMMUNITY

## 2023-04-04 RX ORDER — CHLORAL HYDRATE 500 MG
CAPSULE ORAL
COMMUNITY

## 2023-04-04 RX ORDER — ROSUVASTATIN CALCIUM 40 MG/1
40 TABLET, COATED ORAL DAILY
COMMUNITY

## 2023-04-04 RX ORDER — METOPROLOL SUCCINATE 50 MG/1
50 TABLET, EXTENDED RELEASE ORAL DAILY
COMMUNITY

## 2023-04-04 RX ORDER — MULTIVITAMIN WITH IRON
100 TABLET ORAL DAILY
Qty: 30 TABLET | Refills: 0 | Status: SHIPPED | OUTPATIENT
Start: 2023-04-04

## 2023-04-04 RX ORDER — BACLOFEN 10 MG/1
10 TABLET ORAL 3 TIMES DAILY
COMMUNITY

## 2023-04-04 RX ORDER — TRAMADOL HYDROCHLORIDE 50 MG/1
TABLET ORAL
COMMUNITY
Start: 2023-03-01

## 2023-04-05 ENCOUNTER — DOCUMENTATION (OUTPATIENT)
Dept: PAIN MEDICINE | Facility: CLINIC | Age: 68
End: 2023-04-05
Payer: MEDICARE

## 2023-04-05 NOTE — PROGRESS NOTES
Spoke with Audrey at Dr.Hal Durham's office and she advised that Dr. Durham approved pt to stop Plavix 7 days prior to procedure but to continue taking Aspirin.    LVM for pt advising of above information.

## 2023-04-12 ENCOUNTER — OUTSIDE FACILITY SERVICE (OUTPATIENT)
Dept: PAIN MEDICINE | Facility: CLINIC | Age: 68
End: 2023-04-12
Payer: MEDICARE

## 2023-04-12 ENCOUNTER — TELEPHONE (OUTPATIENT)
Dept: PAIN MEDICINE | Facility: CLINIC | Age: 68
End: 2023-04-12
Payer: MEDICARE

## 2023-04-12 NOTE — TELEPHONE ENCOUNTER
Patient called wanting to know if his rheumate samples had been mailed out. I informed him that Rhea had mailed them Friday, 4/7/2023, and that he should receive them within the next few days. He requested that his wife be permitted to  a pack of the samples at the  today. I told him I would leave some with Thais for his wife to . Patient voiced understanding.

## 2023-04-13 ENCOUNTER — TELEPHONE (OUTPATIENT)
Dept: PAIN MEDICINE | Facility: CLINIC | Age: 68
End: 2023-04-13
Payer: MEDICARE

## 2023-04-13 NOTE — TELEPHONE ENCOUNTER
FOLLOW-UP CALL AFTER PROCEDURE    I spoke with the patientregarding how he is feeling after yesterday's procedure with Dr. Tomlinson. Patient reports that he is doing well.    Levi Simons underwent a diagnostic and therapeutic bilateral L4-L5 transforaminal epidural steroid injections  On 4/13/2023. Patient was examined in the recovery room after the procedure by Dr. Tomlinson. Patient reported 100% pain relief. In addition, patient experienced significant functional improvement (performing activities without experiencing pain in comparison with examination prior to the procedure that reproduced pain with those activities).    Pain level before procedure:8/10    Pain level after procedure: 0/10 Patient reports that the pain relief lasted for 24 hours. Today, the patient reports 70% ongoing pain relief pain (pain level 3/10)    Patient denies side effects or complications, except for mild soreness. Patient understands this is normal and symptoms will subside.    Patient relayed concerns about insurance coverage for his tens unit. I advised him we would send the insurance information to tens company per his request.     He asked whether or not he should keep his appointments for his MRI and EMG tests. I advised him he should.    Patient asked questions about what his visit with Dr. Wiseman. I explained that he will need to answer a number of questions and fill out forms that will help Dr. Wiseman assess whether or not he is a good candidate for a spinal cord stimulator.    Patient does not have any other questions or concerns at this time

## 2023-04-13 NOTE — TELEPHONE ENCOUNTER
"HUB AGENT ATTEMPTED CLINICAL WARM TRANSFER - NO ANSWER     Caller: Levi Simons \"Garrett\"    Relationship: Self    Best call back number: [phone]     What was the call regarding: PATIENT WAS TRANSFERRED TO [facility] THIS MORNING 04-13-23     (WAS EXPECTING A POST PROCEDURE CALL AFTER BILATERAL LUMBAR EPIDURAL STEROID INJECTIONS DONE BY DR REDDY YESTERDAY 04/12/23)    WANTS TO DISCUSS QUESTIONS PATIENT HAS AFTER HIS PROCEDURE     Do you require a callback: OFFICE MENTIONED POST PROCEDURE CALLS ARE USUALLY DONE THROUGHOUT THE DAY. WHEN PATIENT CALLED BACK JUST NOW, INFORMED THAT OFFICE MAY HAVE TO WAIT TILL DR REDDY's PROCEDURE NOTES ARE SIGNED OFF / FINISHED     &or THAT MA's WILL CALL AFTER DONE WITH PATIENTS IN OFFICE AT THE END OF THE DAY    NEXT FOLLOW UP APPT NOT SCHEDULED YET - UNKNOWN WHEN DR REDDY WANTS TO SEE PATIENT BACK IN OFFICE?     PLEASE CALL / LEAVE VMAIL TO DISCUSS POST BILATERAL LUMBAR EPIDURAL STEROID INJECTIONS PROCEDURE QUESTIONS & ALSO TO SCHEDULE / NOTIFY PATIENT OF NEXT APPT.     THANKS   "

## 2023-04-25 ENCOUNTER — TELEPHONE (OUTPATIENT)
Dept: PAIN MEDICINE | Facility: CLINIC | Age: 68
End: 2023-04-25
Payer: MEDICARE

## 2023-04-25 NOTE — TELEPHONE ENCOUNTER
"  Caller: Levi Simons \"Garrett\"    Relationship: Self    Best call back number:[phone]    What is the best time to reach you: ANY     Who are you requesting to speak with (clinical staff, provider,  specific staff member): CLINICAL     Do you know the name of the person who called: YES     What was the call regarding: THE PATIENT STATES THAT HE RECEIVED A LETTER FROM [employer] FOR THE T[employer] STATING THE MEDICARE WILL NOT COVER DUE TO THE PATIENT NOT HAVING THE CORRECT DIAGNOSIS FOR THE COVERED POLICY THE CHRONIC BACK PAIN DIAGNOSIS IS NOT COVERED-PHONE NUMBER FOR [employer] -[phone]    "

## 2023-04-25 NOTE — TELEPHONE ENCOUNTER
Caller: BRYSON    Relationship: SELF    Best call back number: [phone]    What is the medical concern/diagnosis: SPINE    What specialty or service is being requested: MRI OF THE SPINE    What is the provider, practice or medical service name: [facility]    What is the office location: [address] [place] [address]    What is the office phone number: [phone]     FAX NUMBER: [phone]

## 2023-04-27 NOTE — TELEPHONE ENCOUNTER
Spoke with [employer], we will need to get auth and send when authorized.  Relayed message to patient, verbalized understanding.

## 2023-05-15 NOTE — TELEPHONE ENCOUNTER
Provider: DR REDDY  Caller: SHAUNNA SOSA  Relationship to Patient:PATIENT   Pharmacy:   [id number] - [place], [place] - [address] - [phone]  - [phone] FX   [address] [facility], located within [facility] [address]   Phone: [phone] Fax: [phone]       Phone Number: [phone]  Reason for Call: PATIENT CALLED 04/25/23 REGARDING LETTER FROM [employer] FOR THE TENSE UNIT STATING MEDICARE WILL NOT COVER DUE TO THE PATIENT NOT HAVING THE CORRECT DIAGNOSIS FOR THE COVERED POLICY THE CHRONIC BACK PAIN DIAGNOSIS IS NOT COVERED.  [employer] PHONE NUMBER [phone]  When was the patient last seen: 04/12/23 PROCEDURE     PLEASE CALL PATIENT WITH AN UPDATE FROM HIS CALL ON 04/25/23, PATIENT IS BECOMING  FRUSTRATED THAT HE IS MAKING ALL THE CALLS WITH NO ANSWERS.

## 2023-05-16 NOTE — TELEPHONE ENCOUNTER
"Spoke with pt and advised that unfortunately we can't change the dx code for Medicare to cover the tens unit. I advised that there may be other companies that the pt could possibly use to get one. Pt advised that he had gotten one from [employer] and that it was \"stingy\" but Dr. Tomlinson had described the tens unit from [employer] and he would like one like it. I advised I would see what I could find out and call the pt back.   "

## 2023-07-24 DIAGNOSIS — M48.062 LUMBAR STENOSIS WITH NEUROGENIC CLAUDICATION: Primary | ICD-10-CM

## 2023-07-26 ENCOUNTER — OUTSIDE FACILITY SERVICE (OUTPATIENT)
Dept: PAIN MEDICINE | Facility: CLINIC | Age: 68
End: 2023-07-26
Payer: MEDICARE

## 2023-07-26 PROCEDURE — 64483 NJX AA&/STRD TFRM EPI L/S 1: CPT | Performed by: ANESTHESIOLOGY

## 2023-07-26 PROCEDURE — 99152 MOD SED SAME PHYS/QHP 5/>YRS: CPT | Performed by: ANESTHESIOLOGY

## 2023-07-31 ENCOUNTER — TELEPHONE (OUTPATIENT)
Dept: PAIN MEDICINE | Facility: CLINIC | Age: 68
End: 2023-07-31
Payer: MEDICARE

## 2023-10-23 ENCOUNTER — OFFICE VISIT (OUTPATIENT)
Dept: PAIN MEDICINE | Facility: CLINIC | Age: 68
End: 2023-10-23
Payer: MEDICARE

## 2023-10-23 VITALS — HEIGHT: 71 IN | BODY MASS INDEX: 23.8 KG/M2 | WEIGHT: 170 LBS

## 2023-10-23 DIAGNOSIS — M47.812 CERVICAL SPONDYLOSIS WITHOUT MYELOPATHY: ICD-10-CM

## 2023-10-23 DIAGNOSIS — M99.41 CONNECTIVE TISSUE STENOSIS OF NEURAL CANAL OF CERVICAL REGION: ICD-10-CM

## 2023-10-23 DIAGNOSIS — Z79.01 CHRONIC ANTICOAGULATION: ICD-10-CM

## 2023-10-23 DIAGNOSIS — M48.062 LUMBAR STENOSIS WITH NEUROGENIC CLAUDICATION: Primary | ICD-10-CM

## 2023-10-23 DIAGNOSIS — M43.16 SPONDYLOLISTHESIS OF LUMBAR REGION: ICD-10-CM

## 2023-10-23 DIAGNOSIS — M48.062 LUMBAR STENOSIS WITH NEUROGENIC CLAUDICATION: ICD-10-CM

## 2023-10-23 DIAGNOSIS — M47.816 SPONDYLOSIS OF LUMBAR REGION WITHOUT MYELOPATHY OR RADICULOPATHY: ICD-10-CM

## 2023-10-23 PROCEDURE — 1125F AMNT PAIN NOTED PAIN PRSNT: CPT | Performed by: NURSE PRACTITIONER

## 2023-10-23 PROCEDURE — 1160F RVW MEDS BY RX/DR IN RCRD: CPT | Performed by: NURSE PRACTITIONER

## 2023-10-23 PROCEDURE — 99214 OFFICE O/P EST MOD 30 MIN: CPT | Performed by: NURSE PRACTITIONER

## 2023-10-23 PROCEDURE — 1159F MED LIST DOCD IN RCRD: CPT | Performed by: NURSE PRACTITIONER

## 2023-10-23 RX ORDER — LANSOPRAZOLE 30 MG/1
CAPSULE, DELAYED RELEASE ORAL
COMMUNITY

## 2023-10-23 NOTE — PROGRESS NOTES
"Chief Complaint: \"Lower back and leg pain.\"         History of Present Illness:   Patient: Mr. Levi Simons, 68 y.o. male originally referred by Dr. Stephen Ferraro in consultation for chronic intractable lower back and lower extremity pain.  Patient reports a longstanding history of greater than 10 years of chronic lower back and lower extremity pain.  Over the years, he has been treated with conservative measures such as oral analgesics, tramadol, massage therapy, physical therapy without significant improvement.  He had some injections in [place] about 10 years ago with some relief.  Around November 2022, he began to feel pain radiating into his calves, he was prescribed baclofen, oral steroids and physical therapy.  Unfortunately continued to struggle, on 2/15/2023 he underwent MRI of the lumbar spine which demonstrated transitional lumbosacral anatomy with sacralization of the L5 vertebral body.  There is multilevel degenerative changes with multilevel spinal canal, lateral recess and neuroforaminal stenosis.  At L2-L3 there is spondylosis with with a grade 1 anterior listhesis of L2 on L3, Modic type II endplate changes with a posterior disc extrusion and annular fissure.  There is mild central spinal stenosis with moderate neuroforaminal stenosis.  At L4-L5 there is facet hypertrophy with ligamentum flavum thickening and disc desiccation with annular fissure, a grade 1 anterior listhesis with moderate central spinal stenosis and severe lateral recess stenosis with effacement of the L5 nerve roots.  He has underwent neurosurgical consultation with Dr. Stephen Ferraro on 3/13/2023 and was found not to be surgical candidate, as there is not a simple solution or fix to patient's multilevel issues.  He was last seen on July 26, 2023 when he underwent bilateral L4-L5 transforaminal epidural steroid injections, from which he reports experiencing about 50% pain relief and functional improvement lasting almost " "three months, pain has yet to reach previous levels.  Prior to that on April 13, 2023, he underwent diagnostic and therapeutic bilateral L4-L5 transforaminal epidural steroid injections, from which he reported experiencing almost complete pain relief and functional improvement lasting two months, with an ongoing 50-60% pain relief.  He has remained in physical therapy.  He returns today for postprocedure follow-up and evaluation.  Pain Description: Constant lower back pain with intermittent exacerbation, described as aching, burning, dull, sharp, shooting, stabbing and throbbing sensation.   Radiation of Pain: The pain radiates from the back to the gluteal region and into his calves  Pain intensity today: 2/10   Average pain intensity last week: 4/10  Pain intensity ranges from: 2/10 to 6/10  Aggravating factors: Pain increases with bending, standing, walking. Patient denies neurogenic claudication.   Alleviating factors: Pain decreases with sitting down, lying down, heating pads  Associated Symptoms:   Patient reports pain, numbness, and weakness in the lower extremities.   Patient denies any new bladder or bowel problems.   Patient denies difficulties with his balance or recent falls.   Pain does not interfere with ADLs, but interferes with general activities and affects patient's quality of life  Pain does not interfere with sleep causing sleep fragmentation   Stiffness: Lower back    Review of previous therapies and additional medical records:  Levi Simons has already failed the following measures, including:   Conservative Measures: Oral analgesics, topical analgesics, ice, heat, TENs, physical therapy (ongoing), physical therapist directed home exercise program HEP (ongoing), independent exercise program, to name a few  Interventional Measures: In 2012 with \"3 lumbar injections\" by amrit Swan in [place] with significant relief for about 10 years  04/12/2023: DxTx bilateral L4-L5 transforaminal epidural steroid " injections  07/26/2023: bilateral L4-L5 transforaminal epidural steroid injections  Surgical Measures: No history of previous cervical spine, lumbar spine or hip surgery   Levi Simons underwent neurosurgical consultation with Dr. Stephen Ferraro on 3/13/2023 and was found not to be a surgical candidate.  Levi Simons presents with significant comorbidities including coronary artery disease s/p stent placement, on aspirin and Plavix, hyperlipidemia, hypertension, history of TIA, GERD, tobacco user, irritability and anger  In terms of current analgesics, Levi Simons takes: Tylenol, baclofen, ibuprofen, tramadol  I have reviewed Jeff Report consistent with medication reconciliation.  SOAPP: Low Risk         Global Pain Scale 04-04  2023 07-19  2023 10-23  2023        Pain 16.5 8 7        Feelings 2 4 3        Clinical outcomes 8.5 5 4        Activities 2.5 5 6        GPS Total: 21 22 20          The [place] Back Pain Disability Scale   DATE 04-04  2023 07-19  2023 10-23  2023        Sleep through the night 2 2 1        Turn over in bed 1 2 0        Get out of bed 1 2 1        Make your bed 1 1 0        Put on socks (pantyhose) 1 2 1        Ride in a car 3 2 1        Sit in a chair for several hours 3 2 2        Stand up for 20-30 minutes 0 0 0        Climb one flight of stairs 0 0 0        Walk a few blocks (200-300 yards)  1 1 0        Walk several miles 1 1 1        Run one block (about 50 yards) 3 0 3        Take food out of the refrigerator 0 0 0        Reach up to high shelves 0 1 1        Move a chair 1 0 0        Pull or push heavy doors 1 1 1        Bend over to clean the bathtub 3 2 1        Throw a ball 1 0 1        Carry two bags of groceries 1 1 0        Lift and carry a heavy suitcase 3 2 1        Total score 27 22 15          Review of Diagnostic Studies:  MRI of the thoracic spine without contrast 5/9/2023: Per radiology report no imaging is for review.  Mild thoracic levoscoliosis.   Mild spondylosis with anterior spurs at most levels, facet hypertrophy.  Spinal canal lipomatosis mainly at the T10 and T11 level which results in mild narrowing of the thoracic thecal sac.  No high-grade narrowing of the neuroforamen or spinal canal.  Normal signal seen within the thoracic spinal cord.  Lumbar spine x-rays with flexion-extension views 4/4/2023: 6 mm grade 1 anterior listhesis of L2 on L3 on flexion with 5 mm on extension.  L2 spondylolysis.  Grade 1 anterior listhesis of L4 and L5 with no difference in flexion-extension views.  Multilevel moderate degenerative changes.  Transitional lumbosacral right L5.  Cervical spine x-rays with flexion-extension views 4/4/2023: Grade 1 anterior listhesis of C4 on C5 without instability.  Moderate degenerative changes seen diffusely throughout with disc base narrowing, osteophytes and facet arthritis most significant in the lower cervical spine.  MRI of the cervical spine without contrast 4/3/2023: diffuse cervical spondylosis.    C2-C3: Mild to moderate left foraminal stenosis  C3-C4: Disc osteophyte complex, facet hypertrophy.  Modic type I changes.  Severe left foraminal stenosis.  C4-C5: There is osteophyte complex, facet hypertrophy.  Severe left foraminal stenosis  C5-C6: 2 mm retrolisthesis, Modic type I endplate changes.  Disc osteophyte complex with mild right cord abutment.  Severe right foraminal stenosis.  Mild left foraminal stenosis  C6-C7: Facet hypertrophy, disc osteophyte complex, Modic type I changes.  Mild central canal stenosis.  Moderate to severe bilateral foraminal stenosis  C7-T1: 3 mm anterolisthesis.  MRI of the lumbar spine without contrast 2/15/2023: transitional lumbosacral vertebrae with sacralization of L5.  Mild dextroscoliosis with apex at L2-L3.  The conus is seen at T12 and has unremarkable appearance.    T12-L1, L1-L2: No significant canal or foraminal stenosis  L2-L3: Bilateral spondylolysis of L2 associated with grade 1  anterolisthesis of L2 on L3.  Modic type II endplate changes.  Posterior superior disc pseudo extrusion and posterior annular fissure.  Mild canal stenosis and mild to moderate neuroforaminal stenosis  L3-L4: The pedicles are shorter than average.  Slight retrolisthesis.  Disc bulge.  Facet hypertrophy.  Mild canal stenosis and mild foraminal stenosis  L4-L5: Facet hypertrophy, ligamentum flavum hypertrophy, disc desiccation with a annular fissure.  Grade 1 anterolisthesis.  Moderate central canal stenosis, moderate to severe lateral recess stenosis with effacement of the L5 nerve roots.  Mild to moderate left and moderate right neuroforaminal stenosis with effacement of the right L4 nerve root  L5-S1: L5 is partially sacralized.  Rudimentary disc space.    Review of Systems   Constitutional:  Positive for appetite change.   Musculoskeletal:  Positive for back pain and myalgias.   Neurological:  Positive for headaches.   Hematological:  Bruises/bleeds easily.         Patient Active Problem List   Diagnosis    Back pain with radiation    Body mass index (BMI) of 23.0-23.9 in adult    Chronic gastric ulcer with hemorrhage    Chronic osteoarthritis    Coronary artery disease    Gastroesophageal reflux disease without esophagitis    History of TIA (transient ischemic attack)    History of upper gastrointestinal bleeding    Hyperlipidemia    Hypertension, benign    IFG (impaired fasting glucose)    Irritability and anger    Bilateral lumbar radiculopathy    Need for vaccination against Streptococcus pneumoniae    Osteoarthritis of both knees    Screening for prostate cancer    Transient ischemic attack    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    DDD (degenerative disc disease), cervical    Cervical spondylosis without myelopathy    Spondylosis of lumbar region without myelopathy or radiculopathy    Degeneration of lumbar or lumbosacral intervertebral disc    Spondylolisthesis of lumbar region     Lumbar stenosis with neurogenic claudication    History of coronary artery stent placement    Chronic anticoagulation       Past Medical History:   Diagnosis Date    Arthritis 2017    Chronic pain disorder 2022    Clotting disorder 2005    Coronary artery disease 2005    DDD (degenerative disc disease), cervical 04/04/2023    Hyperlipidemia 2005    Hypertension 2005    Infectious viral hepatitis childhood    Low back pain 2005    Lumbosacral disc disease 2012    Pulmonary arterial hypertension 2005    TIA (transient ischemic attack) 2006         Past Surgical History:   Procedure Laterality Date    CAROTID STENT  2005    OTHER SURGICAL HISTORY  2011    upper gi bleed cautery    TONSILLECTOMY  1963    TRIGGER POINT INJECTION  3  steroid shots 2012         Family History   Problem Relation Age of Onset    Cancer Mother         skin    Hearing loss Father     Heart disease Father     Kidney disease Father          Social History     Socioeconomic History    Marital status:    Tobacco Use    Smoking status: Former     Types: Cigars   Substance and Sexual Activity    Alcohol use: Not Currently     Alcohol/week: 7.0 standard drinks of alcohol     Types: 7 Drinks containing 0.5 oz of alcohol per week    Drug use: Never    Sexual activity: Not Currently           Current Outpatient Medications:     acetaminophen (TYLENOL) 650 MG 8 hr tablet, Take  by mouth., Disp: , Rfl:     Alpha Lipoic Acid 200 MG capsule, Take 400 mg by mouth 3 (Three) Times a Day., Disp: 180 capsule, Rfl: 5    aspirin 81 MG EC tablet, Daily., Disp: , Rfl:     B Complex-Folic Acid (Super B Complex Maxi) tablet, Take  by mouth Daily., Disp: , Rfl:     baclofen (LIORESAL) 10 MG tablet, Take 1 tablet by mouth 3 (Three) Times a Day., Disp: , Rfl:     Cholecalciferol 125 MCG (5000 UT) tablet, Take  by mouth Daily., Disp: , Rfl:     clopidogrel (PLAVIX) 75 MG tablet, , Disp: , Rfl:     coenzyme Q10 100 MG capsule, Take  by mouth Daily., Disp: , Rfl:      "ezetimibe (ZETIA) 10 MG tablet, , Disp: , Rfl:     Gel Base gel, 2 g 4 (Four) Times a Day. prilocaine 2%, lidocaine 10%, imipramine 3%, capsaicin 0.001% and mannitol 20%, Disp: 240 g, Rfl: 5    Glucosamine-Vitamin D (Glucosamine Plus Vitamin D) 1500-200 MG-UNIT tablet, Take  by mouth Daily., Disp: , Rfl:     ibuprofen (ADVIL,MOTRIN) 800 MG tablet, Take 1 tablet by mouth Every 6 (Six) Hours As Needed for Mild Pain., Disp: , Rfl:     irbesartan (AVAPRO) 300 MG tablet, irbesartan 300 mg tablet, Disp: , Rfl:     lansoprazole (PREVACID) 30 MG capsule, Take 1 capsule every other day by oral route for 90 days., Disp: , Rfl:     methylcellulose, Laxative, (CITRUCEL) 500 MG tablet tablet, Take 2 tablets by mouth Every 4 (Four) Hours As Needed., Disp: , Rfl:     metoprolol succinate XL (TOPROL-XL) 50 MG 24 hr tablet, Take 1 tablet by mouth Daily., Disp: , Rfl:     Omega-3 Fatty Acids (fish oil) 1000 MG capsule capsule, Take  by mouth Daily With Breakfast., Disp: , Rfl:     rosuvastatin (CRESTOR) 40 MG tablet, Take 1 tablet by mouth Daily., Disp: , Rfl:     traMADol (ULTRAM) 50 MG tablet, Take  by mouth., Disp: , Rfl:     Turmeric (QC TUMERIC COMPLEX PO), Take 500 mg by mouth 2 (Two) Times a Day., Disp: , Rfl:     Ubiquinol 100 MG capsule, Take 1 capsule every day by oral route., Disp: , Rfl:       No Known Allergies      Ht 180.3 cm (71\")   Wt 77.1 kg (170 lb)   BMI 23.71 kg/m²       Physical Exam:  Constitutional: Patient appears well-developed, well-nourished, well-hydrated, appears younger than stated age  HEENT: Head: Normocephalic and atraumatic  Eyes: Conjunctivae and lids are normal  Pupils: Equal, round, reactive to light  Peripheral vascular exam: Femoral: right 2+, left 2+. Posterior tibialis: right 2+ and left 2+. Dorsalis pedis: right 2+ and left 2+. No edema.   Musculoskeletal   Gait and station: Gait evaluation demonstrated a normal gait. Able to walk on heels, toes, tandem walking   Cervical spine: Passive " and active range of motion are limited but without pain. Extension, flexion, lateral flexion, rotation of the cervical spine did not increase or reproduce pain. Cervical facet joint loading maneuvers are negative at this time  Muscles: Presence of active trigger points: None  Shoulders: The range of motion of the glenohumeral joints is slightly limited but without pain. Rotator cuff strength is 5/5.   Lumbar spine: Passive and active range of motion are limited but without significant pain. Extension, flexion, lateral flexion, rotation of the lumbar spine did not increase or reproduce pain. Lumbar facet joint loading maneuvers are negative a this time.  Jhonny test and Gaenslen's test are negative   Piriformis maneuvers are negative   Hip joints: The range of motion of the hip joints is slightly limited but without pain   Palpation of the bilateral greater trochanter, unrevealing   Examination of the Iliotibial band: unrevealing   Neurological:   Patient is alert and oriented to person, place, and time.   Speech: Normal.   Cortical function: Normal mental status.   Cranial nerves 2-12: intact.   Reflex Scores:  Right brachioradialis: 2+  Left brachioradialis: 2+  Right biceps: 2+  Left biceps: 2+  Right triceps: 2+  Left triceps: 2+  Right patellar: 2+  Left patellar: 2+  Right Achilles: 2+  Left Achilles: 2+  Motor strength: 5/5  Motor Tone: Normal  Involuntary movements: None.   Superficial/Primitive Reflexes: Primitive reflexes were absent.   Right Cervantes: Absent  Left Cervantes: Absent  Right ankle clonus: Absent  Left ankle clonus: Absent   Babinsky: Absent  Spurling sign: Negative. Neck tornado test: Negative. Lhermitte sign: Negative. Long tract signs: Negative. Straight leg raising test: Positive, bilaterally. Femoral stretch sign: Negative.   Sensory exam: Intact to light touch, intact pain and temperature sensation, intact vibration sensation and normal proprioception.   Coordination: Normal finger to nose  and heel to shin. Normal balance. Romberg's sign: Negative   Skin and subcutaneous tissue: Skin is warm and intact. No rash noted. No cyanosis.   Psychiatric: Judgment and insight: Normal. Recent and remote memory: Intact. Mood and affect: Normal.     I have completed a physical examination and have updated or changed the appropriate documentation from previous visit, otherwise physical exam is unchanged.         ASSESSMENT:   1. Lumbar stenosis with neurogenic claudication    2. Spondylolisthesis of lumbar region    3. 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 Spondylosis of lumbar region without myelopathy or radiculopathy    5. Cervical spondylosis without myelopathy    6. Chronic anticoagulation          PLAN/MEDICAL DECISION MAKING:  Mr. Levi Simons, 68 y.o. male reports a longstanding history of greater than 10 years of chronic lower back and lower extremity pain.  Over the years, he has been treated with conservative measures such as oral analgesics, tramadol, massage therapy, physical therapy without significant improvement.  He had some injections in [place] about 10 years ago with some relief.  Around November 2022, he began to feel pain radiating into his calves, he was prescribed baclofen, oral steroids and physical therapy.  Unfortunately continued to struggle, on 2/15/2023 he underwent MRI of the lumbar spine which demonstrated transitional lumbosacral anatomy with sacralization of the L5 vertebral body.  There is multilevel degenerative changes with multilevel spinal canal, lateral recess and neuroforaminal stenosis.  At L2-L3 there is spondylosis with with a grade 1 anterior listhesis of L2 on L3, Modic type II endplate changes with a posterior disc extrusion and annular fissure.  There is mild central spinal stenosis with moderate neuroforaminal stenosis.  At L4-L5 there is facet hypertrophy with ligamentum flavum thickening and disc desiccation with annular fissure, a  grade 1 anterior listhesis with moderate central spinal stenosis and severe lateral recess stenosis with effacement of the L5 nerve roots.  He has underwent neurosurgical consultation with Dr. Stephen Ferraro on 3/13/2023 and was found not to be surgical candidate, as there is not a simple solution or fix to patient's multilevel issues. MRI of the cervical spine without contrast on 04/03/2023 revealed diffuse cervical spondylosis with disc osteophyte complexes, mild listhesis, and facet hypertrophy contributing to multilevel foraminal stenosis but without significant canal stenosis. He presents with significant comorbidities particularly coronary artery disease s/p stent placement, on Plavix and aspirin. Patient has failed to obtain pain relief with conservative measures including oral analgesics, topical analgesics, ice, heat, TENs, physical therapy (ongoing), physical therapist directed home exercise program HEP (ongoing), independent exercise program), to name a few. I have reviewed all available patient's medical records as well as previous therapies as referenced above. I had a lengthy conversation with Mr. Levi Simons regarding his chronic pain condition and potential therapeutic options including risks, benefits, alternative therapies, to name a few.  Therefore, I have proposed the following plan:  1. Interventional pain management measures: Patient will need to stop clopidogrel (Plavix) at least 7 days between last dose and procedure (Dr. Durham). Patient will be scheduled for  bilateral L4-L5 transforaminal epidural steroid injections using the lowest effective dose of steroids, under C-arm fluoroscopic guidance, with the use of contrast dye (unless contraindicated) to confirm appropriate needle placement and spread of contrast dye. We may repeat therapeutic bilateral L4-L5 transforaminal epidural steroid injections depending on patient's outcome.  As per current guidelines, epidurals will be  limited to a maximum of 4 sessions per spinal region in a rolling twelve (12) month period. Continuation of epidural steroid injections over 12 months would only be considered under the following provisions;  Patient is a high-risk surgical candidate, or the patient does not desire surgery, or recurrence of pain in the same location relieved with ESIs for at least three months and epidural provides at least 50% sustained improvement of pain and/or 50% objective improvement in function (using same scale as baseline)  Pain is severe enough to cause a significant degree of functional disability or vocational disability  The primary care provider will be notified regarding continuation of procedures and repeat steroid use   Patient is not interested in surgical options.  In addition, he has been found not to be a surgical candidate at this time.  In the case of surgery, he will be required to complete lumbar myelogram with CT post myelogram along with electrodiagnostic studies.  Therefore, we may proceed with a spinal cord stimulator trial with [employer], [employer] or [employer]. Patient has received formal education including risks, benefits, and alternative treatments, as well as detailed information regarding the procedure and specific goals for the trial. Patient has also received didactic materials including educational booklets and DVDs on spinal cord stimulation therapies.  2. Diagnostic studies:  A. Depending on continued response we may order EMG/NCV of the bilateral lower extremities   B. CBC, PT, PTT prior to SCS trial  3. Pharmacological measures: Reviewed and discussed;   A. Patient takes Tylenol, baclofen, ibuprofen, tramadol   C. Continue alpha lipoid acid [id number] mg per day divided into 3 doses  4. Long-term rehabilitation efforts:  A. The patient does not have a history of falls. I did complete a risk assessment for falls.   B. Patient will start a comprehensive physical therapy program for Alter-G, core  strengthening, gait and balance training, neurodynamics, ASTYM, E-STIM, myofascial release, cupping, dry needling, home exercise program  C. Start an exercise program such as water therapy and swimming  D. Contrast therapy: Apply ice-packs for 15-20 minutes, followed by heating pads for 15-20 minutes to affected area   E. Patient will need a referral to Dr. Christos Wiseman for psychological screening prior to consideration of spinal cord stimulation and intrathecal therapies.  F. Continue Pilates/consider adding water therapy  5. The patient has been instructed to contact my office with any questions or difficulties. The patient understands the plan and agrees to proceed accordingly.        Pain Medications               acetaminophen (TYLENOL) 650 MG 8 hr tablet Take  by mouth.    aspirin 81 MG EC tablet Daily.    baclofen (LIORESAL) 10 MG tablet Take 1 tablet by mouth 3 (Three) Times a Day.    ibuprofen (ADVIL,MOTRIN) 800 MG tablet Take 1 tablet by mouth Every 6 (Six) Hours As Needed for Mild Pain.    traMADol (ULTRAM) 50 MG tablet Take  by mouth.             No orders of the defined types were placed in this encounter.     Please note that portions of this note were completed with a voice recognition program.     EMY Barclay    Patient Care Team:  Vikki Navarrete MD as PCP - General (Family Medicine)  Ramiro Tomlinson MD as Consulting Physician (Pain Medicine)  Cherie Hoover APRN as Nurse Practitioner (Nurse Practitioner)  Keven Durham MD as Consulting Physician (Cardiology)     No orders of the defined types were placed in this encounter.        No future appointments.

## 2023-10-25 ENCOUNTER — DOCUMENTATION (OUTPATIENT)
Dept: PAIN MEDICINE | Facility: CLINIC | Age: 68
End: 2023-10-25
Payer: MEDICARE

## 2023-10-25 NOTE — PROGRESS NOTES
Received blood thinner clearance from Dr. Durham's office for pt to hold his Plavix 7 days prior to his procedure.     Pt made aware, no further needs expressed.

## 2023-11-06 DIAGNOSIS — M48.062 LUMBAR STENOSIS WITH NEUROGENIC CLAUDICATION: Primary | ICD-10-CM

## 2023-11-08 ENCOUNTER — OUTSIDE FACILITY SERVICE (OUTPATIENT)
Dept: PAIN MEDICINE | Facility: CLINIC | Age: 68
End: 2023-11-08
Payer: MEDICARE

## 2023-11-08 PROCEDURE — 99152 MOD SED SAME PHYS/QHP 5/>YRS: CPT | Performed by: ANESTHESIOLOGY

## 2023-11-08 PROCEDURE — 64483 NJX AA&/STRD TFRM EPI L/S 1: CPT | Performed by: ANESTHESIOLOGY

## 2024-04-15 ENCOUNTER — OFFICE VISIT (OUTPATIENT)
Dept: PAIN MEDICINE | Facility: CLINIC | Age: 69
End: 2024-04-15
Payer: MEDICARE

## 2024-04-15 VITALS — WEIGHT: 177.8 LBS | BODY MASS INDEX: 24.89 KG/M2 | HEIGHT: 71 IN

## 2024-04-15 DIAGNOSIS — M48.062 LUMBAR STENOSIS WITH NEUROGENIC CLAUDICATION: ICD-10-CM

## 2024-04-15 DIAGNOSIS — M43.16 SPONDYLOLISTHESIS OF LUMBAR REGION: ICD-10-CM

## 2024-04-15 DIAGNOSIS — M47.812 CERVICAL SPONDYLOSIS WITHOUT MYELOPATHY: ICD-10-CM

## 2024-04-15 DIAGNOSIS — Z79.01 CHRONIC ANTICOAGULATION: ICD-10-CM

## 2024-04-15 DIAGNOSIS — M47.816 SPONDYLOSIS OF LUMBAR REGION WITHOUT MYELOPATHY OR RADICULOPATHY: ICD-10-CM

## 2024-04-15 PROCEDURE — 1159F MED LIST DOCD IN RCRD: CPT | Performed by: NURSE PRACTITIONER

## 2024-04-15 PROCEDURE — 1160F RVW MEDS BY RX/DR IN RCRD: CPT | Performed by: NURSE PRACTITIONER

## 2024-04-15 PROCEDURE — 1125F AMNT PAIN NOTED PAIN PRSNT: CPT | Performed by: NURSE PRACTITIONER

## 2024-04-15 PROCEDURE — 99214 OFFICE O/P EST MOD 30 MIN: CPT | Performed by: NURSE PRACTITIONER

## 2024-04-15 RX ORDER — MECOBALAMIN 5000 MCG
TABLET,DISINTEGRATING ORAL
COMMUNITY
Start: 2024-01-15

## 2024-04-15 NOTE — PROGRESS NOTES
"Chief Complaint: \"Lower back and leg pain.\"         History of Present Illness:   Patient: Mr. Levi Simons, 69 y.o. male originally referred by Dr. Stephen Ferraro in consultation for chronic intractable lower back and lower extremity pain.  Patient reports a longstanding history of greater than 10 years of chronic lower back and lower extremity pain.  Over the years, he has been treated with conservative measures such as oral analgesics, tramadol, massage therapy, physical therapy without significant improvement.  He had some injections in [place] about 10 years ago with some relief.  Around November 2022, he began to feel pain radiating into his calves, he was prescribed baclofen, oral steroids and physical therapy.  Unfortunately he continued to struggle, on 2/15/2023 he underwent MRI of the lumbar spine which demonstrated transitional lumbosacral anatomy with sacralization of the L5 vertebral body.  There is multilevel degenerative changes with multilevel spinal canal, lateral recess and neuroforaminal stenosis.  At L2-L3 there is spondylosis with with a grade 1 anterior listhesis of L2 on L3, Modic type II endplate changes with a posterior disc extrusion and annular fissure.  There is mild central spinal stenosis with moderate neuroforaminal stenosis.  At L4-L5 there is facet hypertrophy with ligamentum flavum thickening and disc desiccation with annular fissure, a grade 1 anterior listhesis with moderate central spinal stenosis and severe lateral recess stenosis with effacement of the L5 nerve roots.  He has underwent neurosurgical consultation with Dr. Stephen Ferraro on 3/13/2023 and was found not to be surgical candidate, as there is not a simple solution or fix to patient's multilevel issues.  We last saw him on November 8, 2023 when he underwent therapeutic bilateral L4-L5 transforaminal epidural steroid injections, from which overall he reports experiencing about 60% pain relief and functional " "improvement lasting greater than 4 months.  He does feel, over the past several weeks he has had to take more medication due to instances of his pain.  Prior to that, on July 26, 2023 he underwent bilateral L4-L5 transforaminal epidural steroid injections, from which he reported experiencing about 50% pain relief and functional improvement lasting almost three months.  He has continued a physical therapist directed home exercise program, as well as Pilates.  He returns today for postprocedure follow-up and evaluation.  Pain Description: Constant lower back pain with intermittent exacerbation, described as aching, burning, dull, sharp, shooting, stabbing and throbbing sensation.   Radiation of Pain: The pain radiates from the back to the gluteal region and into his calves  Pain intensity today: 3/10   Average pain intensity last week: 3/10  Pain intensity ranges from: 2/10 to 6/10  Aggravating factors: Pain increases with bending, standing, walking, sitting on hard surfaces, protracted sitting.    Alleviating factors: Pain decreases with sitting down, lying down, heating pads  Associated Symptoms:   Patient reports pain, numbness, and weakness in the lower extremities.   Patient denies any new bladder or bowel problems.   Patient denies difficulties with his balance or recent falls.   Pain does not interfere with ADLs, but interferes with general activities and affects patient's quality of life  Pain does not interfere with sleep causing sleep fragmentation   Stiffness    Review of previous therapies and additional medical records:  Levi Simons has already failed the following measures, including:   Conservative Measures: Oral analgesics, topical analgesics, ice, heat, TENs, physical therapy (ongoing), physical therapist directed home exercise program HEP (ongoing), independent exercise program, to name a few  Interventional Measures: In 2012 with \"3 lumbar injections\" by amrit Swan in [place] with significant relief " for about 10 years  04/12/2023: DxTx bilateral L4-L5 transforaminal epidural steroid injections  07/26/2023: bilateral L4-L5 transforaminal epidural steroid injections  11/08/2023: Tx bilateral L4-L5 transforaminal epidural steroid injections  Surgical Measures: No history of previous cervical spine, lumbar spine or hip surgery   Levi Simons underwent neurosurgical consultation with Dr. Stephen Ferraro on 3/13/2023 and was found not to be a surgical candidate.  Levi Simons presents with significant comorbidities including coronary artery disease s/p stent placement, on aspirin and Plavix, hyperlipidemia, hypertension, history of TIA, GERD, tobacco user, irritability and anger  In terms of current analgesics, Levi Simons takes: Tylenol, baclofen, ibuprofen, tramadol  I have reviewed Jeff Report consistent with medication reconciliation.  SOAPP: Low Risk         Global Pain Scale 04-04  2023 07-19  2023 10-23  2023 04-15  2024       Pain 16.5 8 7 8       Feelings 2 4 3 2       Clinical outcomes 8.5 5 4 3       Activities 2.5 5 6 2       GPS Total: 21 22 20 15         The [place] Back Pain Disability Scale   DATE 04-04  2023 07-19  2023 10-23  2023        Sleep through the night 2 2 1        Turn over in bed 1 2 0        Get out of bed 1 2 1        Make your bed 1 1 0        Put on socks (pantyhose) 1 2 1        Ride in a car 3 2 1        Sit in a chair for several hours 3 2 2        Stand up for 20-30 minutes 0 0 0        Climb one flight of stairs 0 0 0        Walk a few blocks (200-300 yards)  1 1 0        Walk several miles 1 1 1        Run one block (about 50 yards) 3 0 3        Take food out of the refrigerator 0 0 0        Reach up to high shelves 0 1 1        Move a chair 1 0 0        Pull or push heavy doors 1 1 1        Bend over to clean the bathtub 3 2 1        Throw a ball 1 0 1        Carry two bags of groceries 1 1 0        Lift and carry a heavy suitcase 3 2 1        Total score 27  22 15          Review of Diagnostic Studies:  MRI of the thoracic spine without contrast 5/9/2023: Per radiology report no imaging is for review.  Mild thoracic levoscoliosis.  Mild spondylosis with anterior spurs at most levels, facet hypertrophy.  Spinal canal lipomatosis mainly at the T10 and T11 level which results in mild narrowing of the thoracic thecal sac.  No high-grade narrowing of the neuroforamen or spinal canal.  Normal signal seen within the thoracic spinal cord.  Lumbar spine x-rays with flexion-extension views 4/4/2023: 6 mm grade 1 anterior listhesis of L2 on L3 on flexion with 5 mm on extension.  L2 spondylolysis.  Grade 1 anterior listhesis of L4 and L5 with no difference in flexion-extension views.  Multilevel moderate degenerative changes.  Transitional lumbosacral right L5.  Cervical spine x-rays with flexion-extension views 4/4/2023: Grade 1 anterior listhesis of C4 on C5 without instability.  Moderate degenerative changes seen diffusely throughout with disc base narrowing, osteophytes and facet arthritis most significant in the lower cervical spine.  MRI of the cervical spine without contrast 4/3/2023: diffuse cervical spondylosis.    C2-C3: Mild to moderate left foraminal stenosis  C3-C4: Disc osteophyte complex, facet hypertrophy.  Modic type I changes.  Severe left foraminal stenosis.  C4-C5: There is osteophyte complex, facet hypertrophy.  Severe left foraminal stenosis  C5-C6: 2 mm retrolisthesis, Modic type I endplate changes.  Disc osteophyte complex with mild right cord abutment.  Severe right foraminal stenosis.  Mild left foraminal stenosis  C6-C7: Facet hypertrophy, disc osteophyte complex, Modic type I changes.  Mild central canal stenosis.  Moderate to severe bilateral foraminal stenosis  C7-T1: 3 mm anterolisthesis.  MRI of the lumbar spine without contrast 2/15/2023: transitional lumbosacral vertebrae with sacralization of L5.  Mild dextroscoliosis with apex at L2-L3.  The  conus is seen at T12 and has unremarkable appearance.    T12-L1, L1-L2: No significant canal or foraminal stenosis  L2-L3: Bilateral spondylolysis of L2 associated with grade 1 anterolisthesis of L2 on L3.  Modic type II endplate changes.  Posterior superior disc pseudo extrusion and posterior annular fissure.  Mild canal stenosis and mild to moderate neuroforaminal stenosis  L3-L4: The pedicles are shorter than average.  Slight retrolisthesis.  Disc bulge.  Facet hypertrophy.  Mild canal stenosis and mild foraminal stenosis  L4-L5: Facet hypertrophy, ligamentum flavum hypertrophy, disc desiccation with a annular fissure.  Grade 1 anterolisthesis.  Moderate central canal stenosis, moderate to severe lateral recess stenosis with effacement of the L5 nerve roots.  Mild to moderate left and moderate right neuroforaminal stenosis with effacement of the right L4 nerve root  L5-S1: L5 is partially sacralized.  Rudimentary disc space.    Review of Systems   Constitutional:  Positive for appetite change.   Musculoskeletal:  Positive for back pain and myalgias.   Neurological:  Positive for headaches.   Hematological:  Bruises/bleeds easily.         Patient Active Problem List   Diagnosis    Back pain with radiation    Body mass index (BMI) of 23.0-23.9 in adult    Chronic gastric ulcer with hemorrhage    Chronic osteoarthritis    Coronary artery disease    Gastroesophageal reflux disease without esophagitis    History of TIA (transient ischemic attack)    History of upper gastrointestinal bleeding    Hyperlipidemia    Hypertension, benign    IFG (impaired fasting glucose)    Irritability and anger    Bilateral lumbar radiculopathy    Need for vaccination against Streptococcus pneumoniae    Osteoarthritis of both knees    Screening for prostate cancer    Transient ischemic attack    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    DDD (degenerative disc disease), cervical    Cervical spondylosis without  myelopathy    Spondylosis of lumbar region without myelopathy or radiculopathy    Degeneration of lumbar or lumbosacral intervertebral disc    Spondylolisthesis of lumbar region    Lumbar stenosis with neurogenic claudication    History of coronary artery stent placement    Chronic anticoagulation       Past Medical History:   Diagnosis Date    Arthritis 2017    Chronic pain disorder 2022    Clotting disorder 2005    Coronary artery disease 2005    DDD (degenerative disc disease), cervical 04/04/2023    Hyperlipidemia 2005    Hypertension 2005    Infectious viral hepatitis childhood    Low back pain 2005    Lumbosacral disc disease 2012    Pulmonary arterial hypertension 2005    Spinal stenosis 2022    TIA (transient ischemic attack) 2006         Past Surgical History:   Procedure Laterality Date    CAROTID STENT  2005    OTHER SURGICAL HISTORY  2011    upper gi bleed cautery    TONSILLECTOMY  1963    TRIGGER POINT INJECTION  3  steroid shots 2012         Family History   Problem Relation Age of Onset    Cancer Mother         skin    Hearing loss Father     Heart disease Father     Kidney disease Father          Social History     Socioeconomic History    Marital status:    Tobacco Use    Smoking status: Former     Types: Cigars   Substance and Sexual Activity    Alcohol use: Not Currently     Alcohol/week: 7.0 standard drinks of alcohol     Types: 7 Drinks containing 0.5 oz of alcohol per week    Drug use: Never    Sexual activity: Not Currently           Current Outpatient Medications:     acetaminophen (TYLENOL) 650 MG 8 hr tablet, Take  by mouth., Disp: , Rfl:     Alpha Lipoic Acid 200 MG capsule, Take 400 mg by mouth 3 (Three) Times a Day., Disp: 180 capsule, Rfl: 5    aspirin 81 MG EC tablet, Daily., Disp: , Rfl:     B Complex-Folic Acid (Super B Complex Maxi) tablet, Take  by mouth Daily., Disp: , Rfl:     Cholecalciferol 125 MCG (5000 UT) tablet, Take  by mouth Daily., Disp: , Rfl:     clopidogrel  "(PLAVIX) 75 MG tablet, , Disp: , Rfl:     coenzyme Q10 100 MG capsule, Take  by mouth Daily., Disp: , Rfl:     ezetimibe (ZETIA) 10 MG tablet, , Disp: , Rfl:     Gel Base gel, 2 g 4 (Four) Times a Day. prilocaine 2%, lidocaine 10%, imipramine 3%, capsaicin 0.001% and mannitol 20%, Disp: 240 g, Rfl: 5    Glucosamine-Vitamin D (Glucosamine Plus Vitamin D) 1500-200 MG-UNIT tablet, Take  by mouth Daily., Disp: , Rfl:     ibuprofen (ADVIL,MOTRIN) 800 MG tablet, Take 1 tablet by mouth Every 6 (Six) Hours As Needed for Mild Pain., Disp: , Rfl:     irbesartan (AVAPRO) 300 MG tablet, irbesartan 300 mg tablet, Disp: , Rfl:     lansoprazole (PREVACID) 15 MG capsule, , Disp: , Rfl:     methylcellulose, Laxative, (CITRUCEL) 500 MG tablet tablet, Take 2 tablets by mouth Every 4 (Four) Hours As Needed., Disp: , Rfl:     metoprolol succinate XL (TOPROL-XL) 50 MG 24 hr tablet, Take 1 tablet by mouth Daily., Disp: , Rfl:     Omega-3 Fatty Acids (fish oil) 1000 MG capsule capsule, Take  by mouth Daily With Breakfast., Disp: , Rfl:     rosuvastatin (CRESTOR) 40 MG tablet, Take 1 tablet by mouth Daily., Disp: , Rfl:     Turmeric (QC TUMERIC COMPLEX PO), Take 500 mg by mouth 2 (Two) Times a Day., Disp: , Rfl:     Ubiquinol 100 MG capsule, Take 1 capsule every day by oral route., Disp: , Rfl:       No Known Allergies      Ht 180.3 cm (71\")   Wt 80.6 kg (177 lb 12.8 oz)   BMI 24.80 kg/m²       Physical Exam:  Constitutional: Patient appears well-developed, well-nourished, well-hydrated, appears younger than stated age  HEENT: Head: Normocephalic and atraumatic  Eyes: Conjunctivae and lids are normal  Pupils: Equal, round, reactive to light  Peripheral vascular exam: Femoral: right 2+, left 2+. Posterior tibialis: right 2+ and left 2+. Dorsalis pedis: right 2+ and left 2+. No edema.   Musculoskeletal   Gait and station: Gait evaluation demonstrated a normal gait. Able to walk on heels, toes, tandem walking   Lumbar spine: Passive and " active range of motion are limited but without significant pain. Extension, flexion, lateral flexion, rotation of the lumbar spine did not increase or reproduce pain. Lumbar facet joint loading maneuvers are negative a this time.  Jhonny test and Gaenslen's test are negative   Piriformis maneuvers are negative   Hip joints: The range of motion of the hip joints is almost full and without pain   Palpation of the bilateral greater trochanter, unrevealing   Examination of the Iliotibial band: unrevealing   Neurological:   Patient is alert and oriented to person, place, and time.   Speech: Normal.   Cortical function: Normal mental status.   Cranial nerves 2-12: intact.   Reflex Scores:  Right brachioradialis: 2+  Left brachioradialis: 2+  Right biceps: 2+  Left biceps: 2+  Right triceps: 2+  Left triceps: 2+  Right patellar: 2+  Left patellar: 2+  Right Achilles: 2+  Left Achilles: 2+  Motor strength: 5/5  Motor Tone: Normal  Involuntary movements: None.   Superficial/Primitive Reflexes: Primitive reflexes were absent.   Right Cervantes: Absent  Left Cervantes: Absent  Right ankle clonus: Absent  Left ankle clonus: Absent   Babinsky: Absent  Long tract signs: Negative. Straight leg raising test: Negative. Femoral stretch sign: Negative.   Sensory exam: Intact to light touch, intact pain and temperature sensation, intact vibration sensation and normal proprioception.   Coordination: Normal finger to nose and heel to shin. Normal balance. Romberg's sign: Negative   Skin and subcutaneous tissue: Skin is warm and intact. No rash noted. No cyanosis.   Psychiatric: Judgment and insight: Normal. Recent and remote memory: Intact. Mood and affect: Normal.           ASSESSMENT:   1. Lumbar stenosis with neurogenic claudication    2. Spondylolisthesis of lumbar region    3. Spondylosis of lumbar region without myelopathy or radiculopathy    4. Chronic anticoagulation    5. Cervical spondylosis without myelopathy             PLAN/MEDICAL DECISION MAKING:  Mr. Levi Simons, 69 y.o. male reports a longstanding history of greater than 10 years of chronic lower back and lower extremity pain.  Over the years, he has been treated with conservative measures such as oral analgesics, tramadol, massage therapy, physical therapy without significant improvement.  He had some injections in [place] about 10 years ago with some relief.  Around November 2022, he began to feel pain radiating into his calves, he was prescribed baclofen, oral steroids and physical therapy.  Unfortunately continued to struggle, on 2/15/2023 he underwent MRI of the lumbar spine which demonstrated transitional lumbosacral anatomy with sacralization of the L5 vertebral body.  There is multilevel degenerative changes with multilevel spinal canal, lateral recess and neuroforaminal stenosis.  At L2-L3 there is spondylosis with with a grade 1 anterior listhesis of L2 on L3, Modic type II endplate changes with a posterior disc extrusion and annular fissure.  There is mild central spinal stenosis with moderate neuroforaminal stenosis.  At L4-L5 there is facet hypertrophy with ligamentum flavum thickening and disc desiccation with annular fissure, a grade 1 anterior listhesis with moderate central spinal stenosis and severe lateral recess stenosis with effacement of the L5 nerve roots.  He has underwent neurosurgical consultation with Dr. Stephen eFrraro on 3/13/2023 and was found not to be surgical candidate, as there is not a simple solution or fix to patient's multilevel issues. MRI of the cervical spine without contrast on 04/03/2023 revealed diffuse cervical spondylosis with disc osteophyte complexes, mild listhesis, and facet hypertrophy contributing to multilevel foraminal stenosis but without significant canal stenosis. He presents with significant comorbidities particularly coronary artery disease s/p stent placement, on Plavix and aspirin. Patient has failed  to obtain pain relief with conservative measures including oral analgesics, topical analgesics, ice, heat, TENs, physical therapy (ongoing), physical therapist directed home exercise program HEP (ongoing), independent exercise program), to name a few. I have reviewed all available patient's medical records as well as previous therapies as referenced above. I had a lengthy conversation with Mr. Levi Simons regarding his chronic pain condition and potential therapeutic options including risks, benefits, alternative therapies, to name a few.  Therefore, I have proposed the following plan:  1. Interventional pain management measures: Patient will need to stop clopidogrel (Plavix) at least 7 days between last dose and procedure (Dr. Durham) to proceed with bilateral L4-L5 transforaminal epidural steroid injections using the lowest effective dose of steroids, under C-arm fluoroscopic guidance, with the use of contrast dye (unless contraindicated) to confirm appropriate needle placement and spread of contrast dye. We may repeat therapeutic bilateral L4-L5 transforaminal epidural steroid injections depending on patient's outcome.  As per current guidelines, epidurals will be limited to a maximum of 4 sessions per spinal region in a rolling twelve (12) month period. Continuation of epidural steroid injections over 12 months would only be considered under the following provisions;  Patient is a high-risk surgical candidate, or the patient does not desire surgery, or recurrence of pain in the same location relieved with ESIs for at least three months and epidural provides at least 50% sustained improvement of pain and/or 50% objective improvement in function (using same scale as baseline)  Pain is severe enough to cause a significant degree of functional disability or vocational disability  The primary care provider will be notified regarding continuation of procedures and repeat steroid use   Patient is not interested  in surgical options.  In addition, he has been found not to be a surgical candidate at this time.   Therefore if he continues to struggle, and experiences nonsustained relief with minimally invasive interventional pain management measures, we may proceed with a spinal cord stimulator trial with [employer], or [employer]. Patient has received formal education including risks, benefits, and alternative treatments, as well as detailed information regarding the procedure and specific goals for the trial. Patient has also received didactic materials including educational booklets and DVDs on spinal cord stimulation therapies.  2. Diagnostic studies:  A. Depending on continued response we may order EMG/NCV of the bilateral lower extremities   B. CBC, PT, PTT prior to SCS trial  3. Pharmacological measures: Reviewed and discussed;   A. Patient takes Tylenol, baclofen, ibuprofen,    C. Continue alpha lipoid acid 6620-1665 mg per day divided into 3 doses  4. Long-term rehabilitation efforts:  A. The patient does not have a history of falls. I did complete a risk assessment for falls.   B. Patient will start a comprehensive physical therapy program for Alter-G, core strengthening, gait and balance training, neurodynamics, ASTYM, E-STIM, myofascial release, cupping, dry needling, home exercise program  C. Start an exercise program such as water therapy and swimming  D. Contrast therapy: Apply ice-packs for 15-20 minutes, followed by heating pads for 15-20 minutes to affected area   E. Patient will need a referral to Dr. Christos Wiseman for psychological screening prior to consideration of spinal cord stimulation and intrathecal therapies.  F. Continue Pilates  5. The patient has been instructed to contact my office with any questions or difficulties. The patient understands the plan and agrees to proceed accordingly.        Pain Medications               acetaminophen (TYLENOL) 650 MG 8 hr tablet Take  by mouth.    aspirin 81 MG  EC tablet Daily.    ibuprofen (ADVIL,MOTRIN) 800 MG tablet Take 1 tablet by mouth Every 6 (Six) Hours As Needed for Mild Pain.             No orders of the defined types were placed in this encounter.     Please note that portions of this note were completed with a voice recognition program.     EMY Barclay    Patient Care Team:  Vikki Navarrete MD as PCP - General (Family Medicine)  Ramiro Tomlinson MD as Consulting Physician (Pain Medicine)  Cherie Hoover APRN as Nurse Practitioner (Nurse Practitioner)  Keven Durham MD as Consulting Physician (Cardiology)     No orders of the defined types were placed in this encounter.        No future appointments.

## 2024-04-16 ENCOUNTER — TELEPHONE (OUTPATIENT)
Dept: PAIN MEDICINE | Facility: CLINIC | Age: 69
End: 2024-04-16
Payer: MEDICARE

## 2024-04-16 NOTE — TELEPHONE ENCOUNTER
LVM FOR PT ADVISING THAT WE RECEIVED BLOOD THINNER CLEARANCE AND PT CAN HOLD HIS PLAVIX 7 DAYS PRIOR TO HIS PROCEDURE. ANY QUESTIONS, CALL ME.

## 2024-05-08 ENCOUNTER — OUTSIDE FACILITY SERVICE (OUTPATIENT)
Dept: PAIN MEDICINE | Facility: CLINIC | Age: 69
End: 2024-05-08
Payer: MEDICARE

## 2024-05-10 ENCOUNTER — TELEPHONE (OUTPATIENT)
Dept: PAIN MEDICINE | Facility: CLINIC | Age: 69
End: 2024-05-10
Payer: MEDICARE

## 2024-08-26 ENCOUNTER — OFFICE VISIT (OUTPATIENT)
Dept: PAIN MEDICINE | Facility: CLINIC | Age: 69
End: 2024-08-26
Payer: MEDICARE

## 2024-08-26 VITALS — OXYGEN SATURATION: 98 % | HEART RATE: 64 BPM | WEIGHT: 174 LBS | HEIGHT: 71 IN | BODY MASS INDEX: 24.36 KG/M2

## 2024-08-26 DIAGNOSIS — M47.816 SPONDYLOSIS OF LUMBAR REGION WITHOUT MYELOPATHY OR RADICULOPATHY: ICD-10-CM

## 2024-08-26 DIAGNOSIS — M48.062 LUMBAR STENOSIS WITH NEUROGENIC CLAUDICATION: ICD-10-CM

## 2024-08-26 DIAGNOSIS — M48.062 LUMBAR STENOSIS WITH NEUROGENIC CLAUDICATION: Primary | ICD-10-CM

## 2024-08-26 DIAGNOSIS — M43.16 SPONDYLOLISTHESIS OF LUMBAR REGION: ICD-10-CM

## 2024-08-26 DIAGNOSIS — Z79.01 CHRONIC ANTICOAGULATION: ICD-10-CM

## 2024-08-26 PROCEDURE — 1159F MED LIST DOCD IN RCRD: CPT | Performed by: NURSE PRACTITIONER

## 2024-08-26 PROCEDURE — 1125F AMNT PAIN NOTED PAIN PRSNT: CPT | Performed by: NURSE PRACTITIONER

## 2024-08-26 PROCEDURE — 1160F RVW MEDS BY RX/DR IN RCRD: CPT | Performed by: NURSE PRACTITIONER

## 2024-08-26 PROCEDURE — 99214 OFFICE O/P EST MOD 30 MIN: CPT | Performed by: NURSE PRACTITIONER

## 2024-08-26 NOTE — PROGRESS NOTES
"Chief Complaint: \"Lower back and leg pain.\"         History of Present Illness:   Patient: Mr. Levi Simons, 69 y.o. male originally referred by Dr. Stephen Ferraro in consultation for chronic intractable lower back and lower extremity pain.  Patient reports a longstanding history of greater than 10 years of chronic lower back and lower extremity pain.  Over the years, he has been treated with conservative measures such as oral analgesics, tramadol, massage therapy, physical therapy without significant improvement.  He had some injections in [place] about 10 years ago with some relief.  Around November 2022, he began to feel pain radiating into his calves, he was prescribed baclofen, oral steroids and physical therapy.  Unfortunately he continued to struggle, on 2/15/2023 he underwent MRI of the lumbar spine which demonstrated transitional lumbosacral anatomy with sacralization of the L5 vertebral body.  There is multilevel degenerative changes with multilevel spinal canal, lateral recess and neuroforaminal stenosis.  At L2-L3 there is spondylosis with with a grade 1 anterior listhesis of L2 on L3, Modic type II endplate changes with a posterior disc extrusion and annular fissure.  There is mild central spinal stenosis with moderate neuroforaminal stenosis.  At L4-L5 there is facet hypertrophy with ligamentum flavum thickening and disc desiccation with annular fissure, a grade 1 anterior listhesis with moderate central spinal stenosis and severe lateral recess stenosis with effacement of the L5 nerve roots.  He has underwent neurosurgical consultation with Dr. Stephen Ferraro on 3/13/2023 and was found not to be surgical candidate, as there is not a simple solution or fix to patient's multilevel issues.  We last saw him on May 8, 2024, when he underwent therapeutic bilateral L4-L5 transforaminal epidural steroid injections, for which he reports experiencing about 80% pain relief and functional improvement " "lasting 2 months with an additional 60% lasting another month, only recently has he noticed recurrence of his symptoms.  Although he does try to remain quite active, engaging in Pilates, daily walks, etc.  Prior to that, on November 8, 2023 he underwent therapeutic bilateral L4-L5 transforaminal epidural steroid injections, from which overall he reported experiencing about 60% pain relief and functional improvement lasting greater than 4 months. He has continued a physical therapist directed home exercise program.  He returns today for postprocedure follow-up and evaluation.  Pain Description: Constant lower back pain with intermittent exacerbation, described as aching, burning, dull, sharp, shooting, stabbing and throbbing sensation.   Radiation of Pain: The pain radiates from the back to the gluteal region and into his calves  Pain intensity today: 2/10   Average pain intensity last week: 4/10  Pain intensity ranges from: 2/10 to 5/10  Aggravating factors: Pain increases with bending, standing, walking, sitting on hard surfaces, protracted sitting.    Alleviating factors: Pain decreases with sitting down, lying down, heating pads  Associated Symptoms:   Patient reports pain, numbness, and weakness in the lower extremities.   Patient denies any new bladder or bowel problems.   Patient denies difficulties with his balance or recent falls.   Pain does not interfere with ADLs, but interferes with general activities and affects patient's quality of life  Pain does not interfere with sleep causing sleep fragmentation   Stiffness    Review of previous therapies and additional medical records:  Levi Simons has already failed the following measures, including:   Conservative Measures: Oral analgesics, topical analgesics, ice, heat, TENs, physical therapy (ongoing), physical therapist directed home exercise program HEP (ongoing), independent exercise program, to name a few  Interventional Measures: In 2012 with \"3 " "lumbar injections\" by amrit Swan in [place] with significant relief for about 10 years  04/12/2023: DxTx bilateral L4-L5 transforaminal epidural steroid injections  07/26/2023: bilateral L4-L5 transforaminal epidural steroid injections  11/08/2023: Tx bilateral L4-L5 transforaminal epidural steroid injections  05/08/2024: Tx bilateral L4-L5 transforaminal epidural steroid injections  Surgical Measures: No history of previous cervical spine, lumbar spine or hip surgery   Levi Simons underwent neurosurgical consultation with Dr. Stephen Ferraro on 3/13/2023 and was found not to be a surgical candidate.  Levi Simons presents with significant comorbidities including coronary artery disease s/p stent placement, on aspirin and Plavix, hyperlipidemia, hypertension, history of TIA, GERD, tobacco user, irritability and anger  In terms of current analgesics, Levi Simons takes: Tylenol, baclofen, ibuprofen, tramadol  I have reviewed Jeff Report consistent with medication reconciliation.  SOAPP: Low Risk         Global Pain Scale 04-04  2023 07-19  2023 10-23  2023 04-15  2024 08-26  2024      Pain 16.5 8 7 8 8      Feelings 2 4 3 2 2      Clinical outcomes 8.5 5 4 3 3      Activities 2.5 5 6 2 3      GPS Total: 21 22 20 15 16        The [place] Back Pain Disability Scale   DATE 04-04  2023 07-19  2023 10-23  2023 08-26  2024       Sleep through the night 2 2 1 1       Turn over in bed 1 2 0 1       Get out of bed 1 2 1 1       Make your bed 1 1 0 0       Put on socks (pantyhose) 1 2 1 1       Ride in a car 3 2 1 2       Sit in a chair for several hours 3 2 2 3       Stand up for 20-30 minutes 0 0 0 0       Climb one flight of stairs 0 0 0 0       Walk a few blocks (200-300 yards)  1 1 0 0       Walk several miles 1 1 1 2       Run one block (about 50 yards) 3 0 3 3       Take food out of the refrigerator 0 0 0 0       Reach up to high shelves 0 1 1 1       Move a chair 1 0 0 0       Pull or push heavy doors 1 1 1 " 1       Bend over to clean the bathtub 3 2 1 1       Throw a ball 1 0 1 1       Carry two bags of groceries 1 1 0 0       Lift and carry a heavy suitcase 3 2 1 2       Total score 27 22 15 20         Review of Diagnostic Studies:  MRI of the thoracic spine without contrast 5/9/2023: Per radiology report no imaging is for review.  Mild thoracic levoscoliosis.  Mild spondylosis with anterior spurs at most levels, facet hypertrophy.  Spinal canal lipomatosis mainly at the T10 and T11 level which results in mild narrowing of the thoracic thecal sac.  No high-grade narrowing of the neuroforamen or spinal canal.  Normal signal seen within the thoracic spinal cord.  Lumbar spine x-rays with flexion-extension views 4/4/2023: 6 mm grade 1 anterior listhesis of L2 on L3 on flexion with 5 mm on extension.  L2 spondylolysis.  Grade 1 anterior listhesis of L4 and L5 with no difference in flexion-extension views.  Multilevel moderate degenerative changes.  Transitional lumbosacral right L5.  Cervical spine x-rays with flexion-extension views 4/4/2023: Grade 1 anterior listhesis of C4 on C5 without instability.  Moderate degenerative changes seen diffusely throughout with disc base narrowing, osteophytes and facet arthritis most significant in the lower cervical spine.  MRI of the cervical spine without contrast 4/3/2023: diffuse cervical spondylosis.    C2-C3: Mild to moderate left foraminal stenosis  C3-C4: Disc osteophyte complex, facet hypertrophy.  Modic type I changes.  Severe left foraminal stenosis.  C4-C5: There is osteophyte complex, facet hypertrophy.  Severe left foraminal stenosis  C5-C6: 2 mm retrolisthesis, Modic type I endplate changes.  Disc osteophyte complex with mild right cord abutment.  Severe right foraminal stenosis.  Mild left foraminal stenosis  C6-C7: Facet hypertrophy, disc osteophyte complex, Modic type I changes.  Mild central canal stenosis.  Moderate to severe bilateral foraminal stenosis  C7-T1: 3  mm anterolisthesis.  MRI of the lumbar spine without contrast 2/15/2023: transitional lumbosacral vertebrae with sacralization of L5.  Mild dextroscoliosis with apex at L2-L3.  The conus is seen at T12 and has unremarkable appearance.    T12-L1, L1-L2: No significant canal or foraminal stenosis  L2-L3: Bilateral spondylolysis of L2 associated with grade 1 anterolisthesis of L2 on L3.  Modic type II endplate changes.  Posterior superior disc pseudo extrusion and posterior annular fissure.  Mild canal stenosis and mild to moderate neuroforaminal stenosis  L3-L4: The pedicles are shorter than average.  Slight retrolisthesis.  Disc bulge.  Facet hypertrophy.  Mild canal stenosis and mild foraminal stenosis  L4-L5: Facet hypertrophy, ligamentum flavum hypertrophy, disc desiccation with a annular fissure.  Grade 1 anterolisthesis.  Moderate central canal stenosis, moderate to severe lateral recess stenosis with effacement of the L5 nerve roots.  Mild to moderate left and moderate right neuroforaminal stenosis with effacement of the right L4 nerve root  L5-S1: L5 is partially sacralized.  Rudimentary disc space.    Review of Systems   Constitutional:  Positive for appetite change.   Musculoskeletal:  Positive for back pain and myalgias.   Neurological:  Positive for headaches.   Hematological:  Bruises/bleeds easily.         Patient Active Problem List   Diagnosis    Back pain with radiation    Body mass index (BMI) of 23.0-23.9 in adult    Chronic gastric ulcer with hemorrhage    Chronic osteoarthritis    Coronary artery disease    Gastroesophageal reflux disease without esophagitis    History of TIA (transient ischemic attack)    History of upper gastrointestinal bleeding    Hyperlipidemia    Hypertension, benign    IFG (impaired fasting glucose)    Irritability and anger    Bilateral lumbar radiculopathy    Need for vaccination against Streptococcus pneumoniae    Osteoarthritis of both knees    Screening for prostate  cancer    Transient ischemic attack    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    DDD (degenerative disc disease), cervical    Cervical spondylosis without myelopathy    Spondylosis of lumbar region without myelopathy or radiculopathy    Degeneration of lumbar or lumbosacral intervertebral disc    Spondylolisthesis of lumbar region    Lumbar stenosis with neurogenic claudication    History of coronary artery stent placement    Chronic anticoagulation       Past Medical History:   Diagnosis Date    Arthritis 2017    Chronic pain disorder 2022    Clotting disorder 2005    Coronary artery disease 2005    DDD (degenerative disc disease), cervical 04/04/2023    Hyperlipidemia 2005    Hypertension 2005    Infectious viral hepatitis childhood    Low back pain 2005    Lumbosacral disc disease 2012    Pulmonary arterial hypertension 2005    Spinal stenosis 2022    TIA (transient ischemic attack) 2006         Past Surgical History:   Procedure Laterality Date    CAROTID STENT  2005    OTHER SURGICAL HISTORY  2011    upper gi bleed cautery    TONSILLECTOMY  1963    TRIGGER POINT INJECTION  3  steroid shots 2012         Family History   Problem Relation Age of Onset    Cancer Mother         skin    Hypertension Mother     Hearing loss Father     Heart disease Father     Kidney disease Father     Coronary artery disease Father     Hypertension Father          Social History     Socioeconomic History    Marital status:    Tobacco Use    Smoking status: Former     Types: Cigars   Substance and Sexual Activity    Alcohol use: Not Currently     Alcohol/week: 7.0 standard drinks of alcohol     Types: 7 Drinks containing 0.5 oz of alcohol per week    Drug use: Never    Sexual activity: Not Currently           Current Outpatient Medications:     acetaminophen (TYLENOL) 650 MG 8 hr tablet, Take  by mouth., Disp: , Rfl:     Alpha Lipoic Acid 200 MG capsule, Take 400 mg by mouth 3 (Three) Times a Day., Disp:  "180 capsule, Rfl: 5    aspirin 81 MG EC tablet, Daily., Disp: , Rfl:     B Complex-Folic Acid (Super B Complex Maxi) tablet, Take  by mouth Daily., Disp: , Rfl:     Cholecalciferol 125 MCG (5000 UT) tablet, Take  by mouth Daily., Disp: , Rfl:     clopidogrel (PLAVIX) 75 MG tablet, , Disp: , Rfl:     coenzyme Q10 100 MG capsule, Take  by mouth Daily., Disp: , Rfl:     ezetimibe (ZETIA) 10 MG tablet, , Disp: , Rfl:     Gel Base gel, 2 g 4 (Four) Times a Day. prilocaine 2%, lidocaine 10%, imipramine 3%, capsaicin 0.001% and mannitol 20%, Disp: 240 g, Rfl: 5    Glucosamine-Vitamin D (Glucosamine Plus Vitamin D) 1500-200 MG-UNIT tablet, Take  by mouth Daily., Disp: , Rfl:     ibuprofen (ADVIL,MOTRIN) 800 MG tablet, Take 1 tablet by mouth Every 6 (Six) Hours As Needed for Mild Pain., Disp: , Rfl:     irbesartan (AVAPRO) 300 MG tablet, irbesartan 300 mg tablet, Disp: , Rfl:     lansoprazole (PREVACID) 15 MG capsule, , Disp: , Rfl:     methylcellulose, Laxative, (CITRUCEL) 500 MG tablet tablet, Take 2 tablets by mouth Every 4 (Four) Hours As Needed., Disp: , Rfl:     metoprolol succinate XL (TOPROL-XL) 50 MG 24 hr tablet, Take 1 tablet by mouth Daily., Disp: , Rfl:     Omega-3 Fatty Acids (fish oil) 1000 MG capsule capsule, Take  by mouth Daily With Breakfast., Disp: , Rfl:     rosuvastatin (CRESTOR) 40 MG tablet, Take 1 tablet by mouth Daily., Disp: , Rfl:     Turmeric (QC TUMERIC COMPLEX PO), Take 500 mg by mouth 2 (Two) Times a Day., Disp: , Rfl:     Ubiquinol 100 MG capsule, Take 1 capsule every day by oral route., Disp: , Rfl:       No Known Allergies      Pulse 64   Ht 180.3 cm (71\")   Wt 78.9 kg (174 lb)   SpO2 98%   BMI 24.27 kg/m²       Physical Exam:  Constitutional: Patient appears well-developed, well-nourished, well-hydrated, appears younger than stated age  HEENT: Head: Normocephalic and atraumatic  Eyes: Conjunctivae and lids are normal  Pupils: Equal, round, reactive to light  Peripheral vascular exam: " Femoral: right 2+, left 2+. Posterior tibialis: right 2+ and left 2+. Dorsalis pedis: right 2+ and left 2+. No edema.   Musculoskeletal   Gait and station: Gait evaluation demonstrated a normal gait. Able to walk on heels, toes, tandem walking   Lumbar spine: Passive and active range of motion are limited but without significant pain. Extension, flexion, lateral flexion, rotation of the lumbar spine did not increase or reproduce pain. Lumbar facet joint loading maneuvers are negative a this time.  Jhonny test and Gaenslen's test are negative   Piriformis maneuvers are negative   Hip joints: The range of motion of the hip joints is almost full and without pain   Palpation of the bilateral greater trochanter, unrevealing   Examination of the Iliotibial band: unrevealing   Neurological:   Patient is alert and oriented to person, place, and time.   Speech: Normal.   Cortical function: Normal mental status.   Cranial nerves 2-12: intact.   Reflex Scores:  Right brachioradialis: 2+  Left brachioradialis: 2+  Right biceps: 2+  Left biceps: 2+  Right triceps: 2+  Left triceps: 2+  Right patellar: 2+  Left patellar: 2+  Right Achilles: 2+  Left Achilles: 2+  Motor strength: 5/5  Motor Tone: Normal  Involuntary movements: None.   Superficial/Primitive Reflexes: Primitive reflexes were absent.   Right Cervantes: Absent  Left Cervantes: Absent  Right ankle clonus: Absent  Left ankle clonus: Absent   Babinsky: Absent  Long tract signs: Negative. Straight leg raising test: Negative. Femoral stretch sign: Negative.   Sensory exam: Intact to light touch, intact pain and temperature sensation, intact vibration sensation and normal proprioception.   Coordination: Normal finger to nose and heel to shin. Normal balance. Romberg's sign: Negative   Skin and subcutaneous tissue: Skin is warm and intact. No rash noted. No cyanosis.   Psychiatric: Judgment and insight: Normal. Recent and remote memory: Intact. Mood and affect: Normal.            ASSESSMENT:   1. Lumbar stenosis with neurogenic claudication    2. Spondylolisthesis of lumbar region    3. Spondylosis of lumbar region without myelopathy or radiculopathy    4. Chronic anticoagulation              PLAN/MEDICAL DECISION MAKING:  Mr. Levi Simons, 69 y.o. male reports a longstanding history of greater than 10 years of chronic lower back and lower extremity pain.  Over the years, he has been treated with conservative measures such as oral analgesics, tramadol, massage therapy, physical therapy without significant improvement.  He had some injections in [place] about 10 years ago with some relief.  Around November 2022, he began to feel pain radiating into his calves, he was prescribed baclofen, oral steroids and physical therapy.  Unfortunately continued to struggle, on 2/15/2023 he underwent MRI of the lumbar spine which demonstrated transitional lumbosacral anatomy with sacralization of the L5 vertebral body.  There is multilevel degenerative changes with multilevel spinal canal, lateral recess and neuroforaminal stenosis.  At L2-L3 there is spondylosis with with a grade 1 anterior listhesis of L2 on L3, Modic type II endplate changes with a posterior disc extrusion and annular fissure.  There is mild central spinal stenosis with moderate neuroforaminal stenosis.  At L4-L5 there is facet hypertrophy with ligamentum flavum thickening and disc desiccation with annular fissure, a grade 1 anterior listhesis with moderate central spinal stenosis and severe lateral recess stenosis with effacement of the L5 nerve roots.  He has underwent neurosurgical consultation with Dr. Stephen Ferraro on 3/13/2023 and was found not to be surgical candidate, as there is not a simple solution or fix to patient's multilevel issues. MRI of the cervical spine without contrast on 04/03/2023 revealed diffuse cervical spondylosis with disc osteophyte complexes, mild listhesis, and facet hypertrophy contributing  to multilevel foraminal stenosis but without significant canal stenosis. He presents with significant comorbidities particularly coronary artery disease s/p stent placement, on Plavix and aspirin. Patient has failed to obtain pain relief with conservative measures including oral analgesics, topical analgesics, ice, heat, TENs, physical therapy (ongoing), physical therapist directed home exercise program HEP (ongoing), independent exercise program), to name a few. I have reviewed all available patient's medical records as well as previous therapies as referenced above. I had a lengthy conversation with Mr. Levi Simons regarding his chronic pain condition and potential therapeutic options including risks, benefits, alternative therapies, to name a few.  Therefore, I have proposed the following plan:  1. Interventional pain management measures: Patient will need to stop clopidogrel (Plavix) at least 7 days between last dose and procedure (Dr. Durham) to proceed with bilateral L4-L5 transforaminal epidural steroid injections using the lowest effective dose of steroids, under C-arm fluoroscopic guidance, with the use of contrast dye (unless contraindicated) to confirm appropriate needle placement and spread of contrast dye. We may repeat therapeutic bilateral L4-L5 transforaminal epidural steroid injections depending on patient's outcome.  As per current guidelines, epidurals will be limited to a maximum of 4 sessions per spinal region in a rolling twelve (12) month period. Continuation of epidural steroid injections over 12 months would only be considered under the following provisions;  Patient is a high-risk surgical candidate, or the patient does not desire surgery, or recurrence of pain in the same location relieved with ESIs for at least three months and epidural provides at least 50% sustained improvement of pain and/or 50% objective improvement in function (using same scale as baseline)  Pain is severe  enough to cause a significant degree of functional disability or vocational disability  The primary care provider will be notified regarding continuation of procedures and repeat steroid use   Patient is not interested in surgical options.  In addition, he has been found not to be a surgical candidate at this time.   Therefore if he continues to struggle, and experiences nonsustained relief with minimally invasive interventional pain management measures, I would first order a new MRI of the lumbar spine to evaluate what further options we may offer him.   2. Diagnostic studies:  A. Depending on continued response we may order EMG/NCV of the bilateral lower extremities   B. CBC, PT, PTT prior to SCS trial  3. Pharmacological measures: Reviewed and discussed;   A. Patient takes Tylenol, baclofen, ibuprofen,    C. Continue alpha lipoid acid 1210-7210 mg per day divided into 3 doses  4. Long-term rehabilitation efforts:  A. The patient does not have a history of falls. I did complete a risk assessment for falls.   B. Patient will start a comprehensive physical therapy program for Alter-G, core strengthening, gait and balance training, neurodynamics, ASTYM, E-STIM, myofascial release, cupping, dry needling, home exercise program  C. Start an exercise program such as water therapy and swimming  D. Contrast therapy: Apply ice-packs for 15-20 minutes, followed by heating pads for 15-20 minutes to affected area   E. Patient will need a referral to Dr. Christos Wiseman for psychological screening prior to consideration of spinal cord stimulation and intrathecal therapies.  F. Continue Pilates  5. The patient has been instructed to contact my office with any questions or difficulties. The patient understands the plan and agrees to proceed accordingly.        Pain Medications               acetaminophen (TYLENOL) 650 MG 8 hr tablet Take  by mouth.    aspirin 81 MG EC tablet Daily.    ibuprofen (ADVIL,MOTRIN) 800 MG tablet Take 1  tablet by mouth Every 6 (Six) Hours As Needed for Mild Pain.             No orders of the defined types were placed in this encounter.     Please note that portions of this note were completed with a voice recognition program.     EMY Barclay    Patient Care Team:  Vikki Navarrete MD as PCP - General (Family Medicine)  Ramiro Tomlinson MD as Consulting Physician (Pain Medicine)  Cherie Hoover APRN as Nurse Practitioner (Nurse Practitioner)  Keven Durham MD as Consulting Physician (Cardiology)     No orders of the defined types were placed in this encounter.        No future appointments.

## 2024-09-23 ENCOUNTER — DOCUMENTATION (OUTPATIENT)
Dept: PAIN MEDICINE | Facility: CLINIC | Age: 69
End: 2024-09-23

## 2024-09-23 ENCOUNTER — OUTSIDE FACILITY SERVICE (OUTPATIENT)
Dept: PAIN MEDICINE | Facility: CLINIC | Age: 69
End: 2024-09-23
Payer: MEDICARE

## 2024-09-23 PROCEDURE — 64483 NJX AA&/STRD TFRM EPI L/S 1: CPT | Performed by: ANESTHESIOLOGY

## 2024-12-17 NOTE — PROGRESS NOTES
"Chief Complaint: \"Lower back and leg pain.\"         History of Present Illness:   Patient: Mr. Levi Simons, 69 y.o. male originally referred by Dr. Stephen Ferraro in consultation for chronic intractable lower back and lower extremity pain.  Patient reports a longstanding history of greater than 10 years of chronic lower back and lower extremity pain.  Over the years, he has been treated with conservative measures such as oral analgesics, tramadol, massage therapy, physical therapy without significant improvement.  He had some injections in [place] about 10 years ago with some relief.  Around November 2022, he began to feel pain radiating into his calves, he was prescribed baclofen, oral steroids and physical therapy.  Unfortunately he continued to struggle, on 2/15/2023 he underwent MRI of the lumbar spine which demonstrated transitional lumbosacral anatomy with sacralization of the L5 vertebral body.  There is multilevel degenerative changes with multilevel spinal canal, lateral recess and neuroforaminal stenosis.  At L2-L3 there is spondylosis with with a grade 1 anterior listhesis of L2 on L3, Modic type II endplate changes with a posterior disc extrusion and annular fissure.  There is mild central spinal stenosis with moderate neuroforaminal stenosis.  At L4-L5 there is facet hypertrophy with ligamentum flavum thickening and disc desiccation with annular fissure, a grade 1 anterior listhesis with moderate central spinal stenosis and severe lateral recess stenosis with effacement of the L5 nerve roots.  He has underwent neurosurgical consultation with Dr. Stephen Ferraro on 3/13/2023 and was found not to be surgical candidate, as there is not a simple solution or fix to patient's multilevel issues.  We last saw him on September 23, 2024, when he underwent repeat therapeutic bilateral L4-L5 transforaminal epidural steroid injections, for which he reports experiencing almost 100% pain relief lasting 2 1/2 " "months with an ongoing 50% pain relief, although he is experiencing more symptoms into his legs. Prior to that on May 8, 2024, he underwent therapeutic bilateral L4-L5 transforaminal epidural steroid injections, for which he reports experiencing about 80% pain relief and functional improvement lasting 2 months with an additional 60% lasting another month.  Although he does try to remain quite active, engaging in Pilates, daily walks, etc. He has continued a physical therapist directed home exercise program.  He returns today for postprocedure follow-up and evaluation.  Pain Description: Constant lower back pain with intermittent exacerbation, described as aching, burning, dull, sharp, shooting, stabbing and throbbing sensation.   Radiation of Pain: The pain radiates from the back to the gluteal region and into his calves  Pain intensity today: 3/10   Average pain intensity last week: 3/10  Pain intensity ranges from: 2/10 to 4/10  Aggravating factors: Pain increases with bending, standing, walking, sitting on hard surfaces, protracted sitting.    Alleviating factors: Pain decreases with sitting down, lying down, heating pads  Associated Symptoms:   Patient reports pain, numbness, and weakness in the lower extremities.   Patient denies any new bladder or bowel problems.   Patient denies difficulties with his balance or recent falls.   Pain does not interfere with ADLs, but interferes with general activities and affects patient's quality of life  Pain does not interfere with sleep causing sleep fragmentation   Stiffness    Review of previous therapies and additional medical records:  Levi Simons has already failed the following measures, including:   Conservative Measures: Oral analgesics, topical analgesics, ice, heat, TENs, physical therapy (ongoing), physical therapist directed home exercise program HEP (ongoing), independent exercise program, to name a few  Interventional Measures: In 2012 with \"3 lumbar " "injections\" by amrit Swan in [place] with significant relief for about 10 years  04/12/2023: DxTx bilateral L4-L5 transforaminal epidural steroid injections  07/26/2023: bilateral L4-L5 transforaminal epidural steroid injections  11/08/2023: Tx bilateral L4-L5 transforaminal epidural steroid injections  05/08/2024: Tx bilateral L4-L5 transforaminal epidural steroid injections  09/23/2024: Tx bilateral L4-L5 transforaminal epidural steroid injections  Surgical Measures: No history of previous cervical spine, lumbar spine or hip surgery   Levi Simons underwent neurosurgical consultation with Dr. Stephen Ferraro on 3/13/2023 and was found not to be a surgical candidate.  Levi Simons presents with significant comorbidities including coronary artery disease s/p stent placement, on aspirin and Plavix, hyperlipidemia, hypertension, history of TIA, GERD, tobacco user, irritability and anger  In terms of current analgesics, Levi Simons takes: Tylenol, baclofen, ibuprofen, tramadol  I have reviewed Jeff Report consistent with medication reconciliation.  SOAPP: Low Risk         Global Pain Scale 04-04  2023 07-19  2023 10-23  2023 04-15  2024 08-26  2024 12-18  2024     Pain 16.5 8 7 8 8 6     Feelings 2 4 3 2 2 1     Clinical outcomes 8.5 5 4 3 3 2     Activities 2.5 5 6 2 3 3     GPS Total: 21 22 20 15 16 12       The [place] Back Pain Disability Scale   DATE 04-04  2023 07-19  2023 10-23  2023 08-26  2024 12-18  2024      Sleep through the night 2 2 1 1 1      Turn over in bed 1 2 0 1 1      Get out of bed 1 2 1 1 2      Make your bed 1 1 0 0 0      Put on socks (pantyhose) 1 2 1 1 1      Ride in a car 3 2 1 2 3      Sit in a chair for several hours 3 2 2 3 3      Stand up for 20-30 minutes 0 0 0 0 1      Climb one flight of stairs 0 0 0 0 0      Walk a few blocks (200-300 yards)  1 1 0 0 0      Walk several miles 1 1 1 2 1      Run one block (about 50 yards) 3 0 3 3 1      Take food out of the refrigerator 0 0 " 0 0 0      Reach up to high shelves 0 1 1 1 0      Move a chair 1 0 0 0 0      Pull or push heavy doors 1 1 1 1 1      Bend over to clean the bathtub 3 2 1 1 1      Throw a ball 1 0 1 1 1      Carry two bags of groceries 1 1 0 0 0      Lift and carry a heavy suitcase 3 2 1 2 1      Total score 27 22 15 20 18        Review of Diagnostic Studies:  MRI of the thoracic spine without contrast 5/9/2023: Per radiology report no imaging is for review.  Mild thoracic levoscoliosis.  Mild spondylosis with anterior spurs at most levels, facet hypertrophy.  Spinal canal lipomatosis mainly at the T10 and T11 level which results in mild narrowing of the thoracic thecal sac.  No high-grade narrowing of the neuroforamen or spinal canal.  Normal signal seen within the thoracic spinal cord.  Lumbar spine x-rays with flexion-extension views 4/4/2023: 6 mm grade 1 anterior listhesis of L2 on L3 on flexion with 5 mm on extension.  L2 spondylolysis.  Grade 1 anterior listhesis of L4 and L5 with no difference in flexion-extension views.  Multilevel moderate degenerative changes.  Transitional lumbosacral right L5.  Cervical spine x-rays with flexion-extension views 4/4/2023: Grade 1 anterior listhesis of C4 on C5 without instability.  Moderate degenerative changes seen diffusely throughout with disc base narrowing, osteophytes and facet arthritis most significant in the lower cervical spine.  MRI of the cervical spine without contrast 4/3/2023: diffuse cervical spondylosis.    C2-C3: Mild to moderate left foraminal stenosis  C3-C4: Disc osteophyte complex, facet hypertrophy.  Modic type I changes.  Severe left foraminal stenosis.  C4-C5: There is osteophyte complex, facet hypertrophy.  Severe left foraminal stenosis  C5-C6: 2 mm retrolisthesis, Modic type I endplate changes.  Disc osteophyte complex with mild right cord abutment.  Severe right foraminal stenosis.  Mild left foraminal stenosis  C6-C7: Facet hypertrophy, disc osteophyte  complex, Modic type I changes.  Mild central canal stenosis.  Moderate to severe bilateral foraminal stenosis  C7-T1: 3 mm anterolisthesis.  MRI of the lumbar spine without contrast 2/15/2023: transitional lumbosacral vertebrae with sacralization of L5.  Mild dextroscoliosis with apex at L2-L3.  The conus is seen at T12 and has unremarkable appearance.    T12-L1, L1-L2: No significant canal or foraminal stenosis  L2-L3: Bilateral spondylolysis of L2 associated with grade 1 anterolisthesis of L2 on L3.  Modic type II endplate changes.  Posterior superior disc pseudo extrusion and posterior annular fissure.  Mild canal stenosis and mild to moderate neuroforaminal stenosis  L3-L4: The pedicles are shorter than average.  Slight retrolisthesis.  Disc bulge.  Facet hypertrophy.  Mild canal stenosis and mild foraminal stenosis  L4-L5: Facet hypertrophy, ligamentum flavum hypertrophy, disc desiccation with a annular fissure.  Grade 1 anterolisthesis.  Moderate central canal stenosis, moderate to severe lateral recess stenosis with effacement of the L5 nerve roots.  Mild to moderate left and moderate right neuroforaminal stenosis with effacement of the right L4 nerve root  L5-S1: L5 is partially sacralized.  Rudimentary disc space.    Review of Systems   Constitutional:  Positive for appetite change.   Musculoskeletal:  Positive for arthralgias, back pain and myalgias.   Neurological:  Positive for headaches.   Hematological:  Bruises/bleeds easily.         Patient Active Problem List   Diagnosis    Back pain with radiation    Body mass index (BMI) of 23.0-23.9 in adult    Chronic gastric ulcer with hemorrhage    Chronic osteoarthritis    Coronary artery disease    Gastroesophageal reflux disease without esophagitis    History of TIA (transient ischemic attack)    History of upper gastrointestinal bleeding    Hyperlipidemia    Hypertension, benign    IFG (impaired fasting glucose)    Irritability and anger    Bilateral  lumbar radiculopathy    Need for vaccination against Streptococcus pneumoniae    Osteoarthritis of both knees    Screening for prostate cancer    Transient ischemic attack    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    DDD (degenerative disc disease), cervical    Cervical spondylosis without myelopathy    Spondylosis of lumbar region without myelopathy or radiculopathy    Degeneration of lumbar or lumbosacral intervertebral disc    Spondylolisthesis of lumbar region    Lumbar stenosis with neurogenic claudication    History of coronary artery stent placement    Chronic anticoagulation       Past Medical History:   Diagnosis Date    Arthritis 2017    Chronic pain disorder 2022    Clotting disorder 2005    Coronary artery disease 2005    DDD (degenerative disc disease), cervical 04/04/2023    Hyperlipidemia 2005    Hypertension 2005    Infectious viral hepatitis childhood    Low back pain 2005    Lumbosacral disc disease 2012    Pulmonary arterial hypertension 2005    Spinal stenosis 2022    TIA (transient ischemic attack) 2006         Past Surgical History:   Procedure Laterality Date    CAROTID STENT  2005    OTHER SURGICAL HISTORY  2011    upper gi bleed cautery    TONSILLECTOMY  1963    TRIGGER POINT INJECTION  3  steroid shots 2012         Family History   Problem Relation Age of Onset    Cancer Mother         skin    Hypertension Mother     Hearing loss Father     Heart disease Father     Kidney disease Father     Coronary artery disease Father     Hypertension Father          Social History     Socioeconomic History    Marital status:    Tobacco Use    Smoking status: Former     Types: Cigars   Vaping Use    Vaping status: Never Used   Substance and Sexual Activity    Alcohol use: Not Currently     Alcohol/week: 7.0 standard drinks of alcohol     Types: 7 Drinks containing 0.5 oz of alcohol per week    Drug use: Never    Sexual activity: Not Currently           Current Outpatient  "Medications:     acetaminophen (TYLENOL) 650 MG 8 hr tablet, Take  by mouth., Disp: , Rfl:     Alpha Lipoic Acid 200 MG capsule, Take 400 mg by mouth 3 (Three) Times a Day., Disp: 180 capsule, Rfl: 5    aspirin 81 MG EC tablet, Daily., Disp: , Rfl:     B Complex-Folic Acid (Super B Complex Maxi) tablet, Take  by mouth Daily., Disp: , Rfl:     Cholecalciferol 125 MCG (5000 UT) tablet, Take  by mouth Daily., Disp: , Rfl:     clopidogrel (PLAVIX) 75 MG tablet, , Disp: , Rfl:     coenzyme Q10 100 MG capsule, Take  by mouth Daily., Disp: , Rfl:     ezetimibe (ZETIA) 10 MG tablet, , Disp: , Rfl:     Gel Base gel, 2 g 4 (Four) Times a Day. prilocaine 2%, lidocaine 10%, imipramine 3%, capsaicin 0.001% and mannitol 20%, Disp: 240 g, Rfl: 5    Glucosamine-Vitamin D (Glucosamine Plus Vitamin D) 1500-200 MG-UNIT tablet, Take  by mouth Daily., Disp: , Rfl:     ibuprofen (ADVIL,MOTRIN) 800 MG tablet, Take 1 tablet by mouth Every 6 (Six) Hours As Needed for Mild Pain., Disp: , Rfl:     irbesartan (AVAPRO) 300 MG tablet, irbesartan 300 mg tablet, Disp: , Rfl:     lansoprazole (PREVACID) 15 MG capsule, , Disp: , Rfl:     methylcellulose, Laxative, (CITRUCEL) 500 MG tablet tablet, Take 2 tablets by mouth Every 4 (Four) Hours As Needed., Disp: , Rfl:     metoprolol succinate XL (TOPROL-XL) 50 MG 24 hr tablet, Take 1 tablet by mouth Daily., Disp: , Rfl:     Omega-3 Fatty Acids (fish oil) 1000 MG capsule capsule, Take  by mouth Daily With Breakfast., Disp: , Rfl:     rosuvastatin (CRESTOR) 40 MG tablet, Take 1 tablet by mouth Daily., Disp: , Rfl:     Turmeric (QC TUMERIC COMPLEX PO), Take 500 mg by mouth 2 (Two) Times a Day., Disp: , Rfl:     Ubiquinol 100 MG capsule, Take 1 capsule every day by oral route., Disp: , Rfl:       No Known Allergies      Ht 180.3 cm (71\")   Wt 79 kg (174 lb 1.6 oz)   BMI 24.28 kg/m²       Physical Exam:  Constitutional: Patient appears well-developed, well-nourished, well-hydrated, appears younger than " stated age  HEENT: Head: Normocephalic and atraumatic  Eyes: Conjunctivae and lids are normal  Pupils: Equal, round, reactive to light  Peripheral vascular exam: Femoral: right 2+, left 2+. Posterior tibialis: right 2+ and left 2+. Dorsalis pedis: right 2+ and left 2+. No edema.   Musculoskeletal   Gait and station: Gait evaluation demonstrated a normal gait. Able to walk on heels, toes, tandem walking   Lumbar spine: Passive and active range of motion are limited but without significant pain. Extension, flexion, lateral flexion, rotation of the lumbar spine did not increase or reproduce pain. Lumbar facet joint loading maneuvers are negative a this time.  Jhonny test and Gaenslen's test are negative   Piriformis maneuvers are negative   Hip joints: The range of motion of the hip joints is almost full and without pain   Palpation of the bilateral greater trochanter, unrevealing   Examination of the Iliotibial band: unrevealing   Neurological:   Patient is alert and oriented to person, place, and time.   Speech: Normal.   Cortical function: Normal mental status.   Cranial nerves 2-12: intact.   Reflex Scores:  Right brachioradialis: 2+  Left brachioradialis: 2+  Right biceps: 2+  Left biceps: 2+  Right triceps: 2+  Left triceps: 2+  Right patellar: 2+  Left patellar: 2+  Right Achilles: 2+  Left Achilles: 2+  Motor strength: 5/5  Motor Tone: Normal  Involuntary movements: None.   Superficial/Primitive Reflexes: Primitive reflexes were absent.   Right Cervantes: Absent  Left Cervantes: Absent  Right ankle clonus: Absent  Left ankle clonus: Absent   Babinsky: Absent  Long tract signs: Negative. Straight leg raising test: Negative. Femoral stretch sign: Negative.   Sensory exam: Intact to light touch, intact pain and temperature sensation, intact vibration sensation and normal proprioception.   Coordination: Normal finger to nose and heel to shin. Normal balance. Romberg's sign: Negative   Skin and subcutaneous tissue:  Skin is warm and intact. No rash noted. No cyanosis.   Psychiatric: Judgment and insight: Normal. Recent and remote memory: Intact. Mood and affect: Normal.           ASSESSMENT:   1. Lumbar stenosis with neurogenic claudication    2. Spondylolisthesis of lumbar region    3. Spondylosis of lumbar region without myelopathy or radiculopathy    4. Cervical spondylosis without myelopathy    5. 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 Chronic anticoagulation                PLAN/MEDICAL DECISION MAKING:  Mr. Levi Simons, 69 y.o. male reports a longstanding history of greater than 10 years of chronic lower back and lower extremity pain.  Over the years, he has been treated with conservative measures such as oral analgesics, tramadol, massage therapy, physical therapy without significant improvement.  He had some injections in [place] about 10 years ago with some relief.  Around November 2022, he began to feel pain radiating into his calves, he was prescribed baclofen, oral steroids and physical therapy.  Unfortunately continued to struggle, on 2/15/2023 he underwent MRI of the lumbar spine which demonstrated transitional lumbosacral anatomy with sacralization of the L5 vertebral body.  There is multilevel degenerative changes with multilevel spinal canal, lateral recess and neuroforaminal stenosis.  At L2-L3 there is spondylosis with with a grade 1 anterior listhesis of L2 on L3, Modic type II endplate changes with a posterior disc extrusion and annular fissure.  There is mild central spinal stenosis with moderate neuroforaminal stenosis.  At L4-L5 there is facet hypertrophy with ligamentum flavum thickening and disc desiccation with annular fissure, a grade 1 anterior listhesis with moderate central spinal stenosis and severe lateral recess stenosis with effacement of the L5 nerve roots.  He has underwent neurosurgical consultation with Dr. Stephen Ferraro on 3/13/2023 and was found not to  be surgical candidate, as there is not a simple solution or fix to patient's multilevel issues. MRI of the cervical spine without contrast on 04/03/2023 revealed diffuse cervical spondylosis with disc osteophyte complexes, mild listhesis, and facet hypertrophy contributing to multilevel foraminal stenosis but without significant canal stenosis. He presents with significant comorbidities particularly coronary artery disease s/p stent placement, on Plavix and aspirin. Patient has failed to obtain pain relief with conservative measures including oral analgesics, topical analgesics, ice, heat, TENs, physical therapy (ongoing), physical therapist directed home exercise program HEP (ongoing), independent exercise program), to name a few. I have reviewed all available patient's medical records as well as previous therapies as referenced above. I had a lengthy conversation with Mr. Levi Simons regarding his chronic pain condition and potential therapeutic options including risks, benefits, alternative therapies, to name a few.  Therefore, I have proposed the following plan:  1. Interventional pain management measures: Patient will need to stop clopidogrel (Plavix) at least 7 days between last dose and procedure (Dr. Durham) to proceed with bilateral L4-L5 transforaminal epidural steroid injections using the lowest effective dose of steroids, under C-arm fluoroscopic guidance, with the use of contrast dye (unless contraindicated) to confirm appropriate needle placement and spread of contrast dye. We may repeat therapeutic bilateral L4-L5 transforaminal epidural steroid injections depending on patient's outcome.  As per current guidelines, epidurals will be limited to a maximum of 4 sessions per spinal region in a rolling twelve (12) month period. Continuation of epidural steroid injections over 12 months would only be considered under the following provisions;  Patient is a high-risk surgical candidate, or the patient  does not desire surgery, or recurrence of pain in the same location relieved with ESIs for at least three months and epidural provides at least 50% sustained improvement of pain and/or 50% objective improvement in function (using same scale as baseline)  Pain is severe enough to cause a significant degree of functional disability or vocational disability  The primary care provider will be notified regarding continuation of procedures and repeat steroid use   Patient is not interested in surgical options.  In addition, he has been found not to be a surgical candidate at this time.   Therefore if he continues to struggle, and experiences nonsustained relief with minimally invasive interventional pain management measures, I would first order a new MRI of the lumbar spine to evaluate what further options we may offer him.   2. Diagnostic studies:  A. Depending on continued response we may order EMG/NCV of the bilateral lower extremities   B. CBC, PT, PTT prior to SCS trial  3. Pharmacological measures: Reviewed and discussed;   A. Patient takes Tylenol, baclofen, ibuprofen,    C. Continue alpha lipoid acid 9028-4107 mg per day divided into 3 doses  4. Long-term rehabilitation efforts:  A. The patient does not have a history of falls. I did complete a risk assessment for falls.   B. Patient will start a comprehensive physical therapy program for Alter-G, core strengthening, gait and balance training, neurodynamics, ASTYM, E-STIM, myofascial release, cupping, dry needling, home exercise program  C. Start an exercise program such as water therapy and swimming  D. Contrast therapy: Apply ice-packs for 15-20 minutes, followed by heating pads for 15-20 minutes to affected area   E. Patient will need a referral to Dr. Christos Wiseman for psychological screening prior to consideration of spinal cord stimulation and intrathecal therapies.  F. Continue Pilates  5. The patient has been instructed to contact my office with any  questions or difficulties. The patient understands the plan and agrees to proceed accordingly.        Pain Medications               acetaminophen (TYLENOL) 650 MG 8 hr tablet Take  by mouth.    aspirin 81 MG EC tablet Daily.    ibuprofen (ADVIL,MOTRIN) 800 MG tablet Take 1 tablet by mouth Every 6 (Six) Hours As Needed for Mild Pain.             No orders of the defined types were placed in this encounter.     Please note that portions of this note were completed with a voice recognition program.     EMY Barclay    Patient Care Team:  Vikki Navarrete MD as PCP - General (Family Medicine)  Ramiro Tomlinson MD as Consulting Physician (Pain Medicine)  Cherie Hoover APRN as Nurse Practitioner (Nurse Practitioner)  Keven Durham MD as Consulting Physician (Cardiology)     No orders of the defined types were placed in this encounter.        No future appointments.

## 2024-12-18 ENCOUNTER — OFFICE VISIT (OUTPATIENT)
Dept: PAIN MEDICINE | Facility: CLINIC | Age: 69
End: 2024-12-18
Payer: MEDICARE

## 2024-12-18 VITALS — BODY MASS INDEX: 24.37 KG/M2 | WEIGHT: 174.1 LBS | HEIGHT: 71 IN

## 2024-12-18 DIAGNOSIS — M43.16 SPONDYLOLISTHESIS OF LUMBAR REGION: ICD-10-CM

## 2024-12-18 DIAGNOSIS — M48.062 LUMBAR STENOSIS WITH NEUROGENIC CLAUDICATION: ICD-10-CM

## 2024-12-18 DIAGNOSIS — M48.062 LUMBAR STENOSIS WITH NEUROGENIC CLAUDICATION: Primary | ICD-10-CM

## 2024-12-18 DIAGNOSIS — M47.812 CERVICAL SPONDYLOSIS WITHOUT MYELOPATHY: ICD-10-CM

## 2024-12-18 DIAGNOSIS — M99.41 CONNECTIVE TISSUE STENOSIS OF NEURAL CANAL OF CERVICAL REGION: ICD-10-CM

## 2024-12-18 DIAGNOSIS — M47.816 SPONDYLOSIS OF LUMBAR REGION WITHOUT MYELOPATHY OR RADICULOPATHY: ICD-10-CM

## 2024-12-18 DIAGNOSIS — Z79.01 CHRONIC ANTICOAGULATION: ICD-10-CM

## 2024-12-30 ENCOUNTER — OUTSIDE FACILITY SERVICE (OUTPATIENT)
Dept: PAIN MEDICINE | Facility: CLINIC | Age: 69
End: 2024-12-30
Payer: MEDICARE

## 2025-01-02 ENCOUNTER — TELEPHONE (OUTPATIENT)
Dept: PAIN MEDICINE | Facility: CLINIC | Age: 70
End: 2025-01-02
Payer: MEDICARE

## 2025-01-02 NOTE — TELEPHONE ENCOUNTER
FOLLOW UP CALL AFTER PROCEDURE    I spoke with the patient regarding how he is feeling after his procedure with Dr Tomlinson on 12/30/24. Patient reports that he is doing well. Patient stated he has noticed some functional improvement. He went to PT today and did some leg lifts and he felt good.     Levi Simons underwent a repeat bilateral L4-L5 transforaminal epidural steroid injections  on 12/30/24. Patient reported 70% relief at the time of after procedure exam with Dr Tomlinson. In addition, patient experienced some functional improvement (perforing activities without experiencing pain compared with examoination prior to procedure that produced these activities.)     Pain level before procedure: 6/10  Pain level after procedure: 0/10    Today, the patient reports 70% pain relief (pain level 3/10.)    Patient denies side effects or complications.  Patient does not have any questions or concerns at this time.    Advised patient of follow up with EMY Brock on 4/2/25.

## 2025-04-01 NOTE — PROGRESS NOTES
"Chief Complaint: \"Lower back and leg pain.\"         History of Present Illness:   Patient: Mr. Levi Simons, 70 y.o. male originally referred by Dr. Stephen Ferraro in consultation for chronic intractable lower back and lower extremity pain.  Patient reports a longstanding history of greater than 10 years of chronic lower back and lower extremity pain.  Over the years, he has been treated with conservative measures such as oral analgesics, tramadol, massage therapy, physical therapy without significant improvement.  He had some injections in [place] about 10 years ago with some relief.  Around November 2022, he began to feel pain radiating into his calves, he was prescribed baclofen, oral steroids and physical therapy.  Unfortunately he continued to struggle, on 2/15/2023 he underwent MRI of the lumbar spine which demonstrated transitional lumbosacral anatomy with sacralization of the L5 vertebral body.  There is multilevel degenerative changes with multilevel spinal canal, lateral recess and neuroforaminal stenosis.  At L2-L3 there is spondylosis with with a grade 1 anterior listhesis of L2 on L3, Modic type II endplate changes with a posterior disc extrusion and annular fissure.  There is mild central spinal stenosis with moderate neuroforaminal stenosis.  At L4-L5 there is facet hypertrophy with ligamentum flavum thickening and disc desiccation with annular fissure, a grade 1 anterior listhesis with moderate central spinal stenosis and severe lateral recess stenosis with effacement of the L5 nerve roots.  He has underwent neurosurgical consultation with Dr. Stephen Ferraro on 3/13/2023 and was found not to be surgical candidate, as there is not a simple solution or fix to patient's multilevel issues.  We last saw him on December 30, 2024, when he underwent a therapeutic bilateral L4-L5 transforaminal epidural steroid injection, for which she reports experiencing almost 100% pain relief and functional " improvement lasting 3 months, over the last 2 weeks, he has felt more sensations radiating into his calves, which is normal for him.  He is also been doing quite a bit more exercise and walking, and has felt more symptoms radiating into his right posterior thigh.  Prior to that, on September 23, 2024, he underwent repeat therapeutic bilateral L4-L5 transforaminal epidural steroid injections, for which he reports experiencing almost 100% pain relief lasting 2 1/2 months with an ongoing 50% pain relief, although he is experiencing more symptoms into his legs.  He does remain quite active, engaging in Pilates, daily walks, etc. He has continued a physical therapist directed home exercise program.  He returns today for postprocedure follow-up and evaluation.  Pain Description: Constant lower back pain with intermittent exacerbation, described as aching, burning, dull, sharp, shooting, stabbing and throbbing sensation.   Radiation of Pain: The pain radiates from the back to the gluteal region and into his calves  Pain intensity today: 4/10   Average pain intensity last week: 3/10  Pain intensity ranges from: 2/10 to 5/10  Aggravating factors: Pain increases with bending, standing, walking, sitting on hard surfaces, protracted sitting.    Alleviating factors: Pain decreases with sitting down, lying down, heating pads  Associated Symptoms:   Patient reports pain, numbness, and weakness in the lower extremities RT>LT.   Patient denies any new bladder or bowel problems.   Patient denies difficulties with his balance or recent falls.   Pain does not interfere with ADLs, but interferes with general activities and affects patient's quality of life  Pain does not interfere with sleep causing sleep fragmentation   Stiffness    Review of previous therapies and additional medical records:  Levi LOTT Ramoss has already failed the following measures, including:   Conservative Measures: Oral analgesics, topical analgesics, ice, heat,  "TENs, physical therapy (ongoing), physical therapist directed home exercise program HEP (ongoing), independent exercise program, to name a few  Interventional Measures: In 2012 with \"3 lumbar injections\" by amrit Swan in [place] with significant relief for about 10 years  04/12/2023: DxTx bilateral L4-L5 transforaminal epidural steroid injections  07/26/2023: bilateral L4-L5 transforaminal epidural steroid injections  11/08/2023: Tx bilateral L4-L5 transforaminal epidural steroid injections  05/08/2024: Tx bilateral L4-L5 transforaminal epidural steroid injections  09/23/2024: Tx bilateral L4-L5 transforaminal epidural steroid injections  12/30/2024: Tx bilateral L4-L5 transforaminal epidural steroid injections  Surgical Measures: No history of previous cervical spine, lumbar spine or hip surgery   Levi Simons underwent neurosurgical consultation with Dr. Stephen Ferraro on 3/13/2023 and was found not to be a surgical candidate.  Levi Simons presents with significant comorbidities including coronary artery disease s/p stent placement, on aspirin and Plavix, hyperlipidemia, hypertension, history of TIA, GERD, tobacco user, irritability and anger  In terms of current analgesics, Levi Simons takes: Tylenol, baclofen, ibuprofen, tramadol  I have reviewed Jeff Report consistent with medication reconciliation.  SOAPP: Low Risk         Global Pain Scale 04-04  2023 07-19  2023 10-23  2023 04-15  2024 08-26  2024 12-18  2024 04-02  2025    Pain 16.5 8 7 8 8 6 8    Feelings 2 4 3 2 2 1 1    Clinical outcomes 8.5 5 4 3 3 2 4    Activities 2.5 5 6 2 3 3 4    GPS Total: 21 22 20 15 16 12 17      The [place] Back Pain Disability Scale   DATE 04-04  2023 07-19  2023 10-23  2023 08-26  2024 12-18  2024 04-02  2025     Sleep through the night 2 2 1 1 1 1     Turn over in bed 1 2 0 1 1 0     Get out of bed 1 2 1 1 2 1     Make your bed 1 1 0 0 0 0     Put on socks (pantyhose) 1 2 1 1 1 1     Ride in a car 3 2 1 2 3 2   "   Sit in a chair for several hours 3 2 2 3 3 3     Stand up for 20-30 minutes 0 0 0 0 1 1     Climb one flight of stairs 0 0 0 0 0 0     Walk a few blocks (200-300 yards)  1 1 0 0 0 0     Walk several miles 1 1 1 2 1 2     Run one block (about 50 yards) 3 0 3 3 1 3     Take food out of the refrigerator 0 0 0 0 0 0     Reach up to high shelves 0 1 1 1 0 0     Move a chair 1 0 0 0 0 0     Pull or push heavy doors 1 1 1 1 1 0     Bend over to clean the bathtub 3 2 1 1 1 0     Throw a ball 1 0 1 1 1 1     Carry two bags of groceries 1 1 0 0 0 0     Lift and carry a heavy suitcase 3 2 1 2 1 1     Total score 27 22 15 20 18 16       Review of Diagnostic Studies:  MRI of the thoracic spine without contrast 5/9/2023: Per radiology report no imaging is for review.  Mild thoracic levoscoliosis.  Mild spondylosis with anterior spurs at most levels, facet hypertrophy.  Spinal canal lipomatosis mainly at the T10 and T11 level which results in mild narrowing of the thoracic thecal sac.  No high-grade narrowing of the neuroforamen or spinal canal.  Normal signal seen within the thoracic spinal cord.  Lumbar spine x-rays with flexion-extension views 4/4/2023: 6 mm grade 1 anterior listhesis of L2 on L3 on flexion with 5 mm on extension.  L2 spondylolysis.  Grade 1 anterior listhesis of L4 and L5 with no difference in flexion-extension views.  Multilevel moderate degenerative changes.  Transitional lumbosacral right L5.  Cervical spine x-rays with flexion-extension views 4/4/2023: Grade 1 anterior listhesis of C4 on C5 without instability.  Moderate degenerative changes seen diffusely throughout with disc base narrowing, osteophytes and facet arthritis most significant in the lower cervical spine.  MRI of the cervical spine without contrast 4/3/2023: diffuse cervical spondylosis.    C2-C3: Mild to moderate left foraminal stenosis  C3-C4: Disc osteophyte complex, facet hypertrophy.  Modic type I changes.  Severe left foraminal  stenosis.  C4-C5: There is osteophyte complex, facet hypertrophy.  Severe left foraminal stenosis  C5-C6: 2 mm retrolisthesis, Modic type I endplate changes.  Disc osteophyte complex with mild right cord abutment.  Severe right foraminal stenosis.  Mild left foraminal stenosis  C6-C7: Facet hypertrophy, disc osteophyte complex, Modic type I changes.  Mild central canal stenosis.  Moderate to severe bilateral foraminal stenosis  C7-T1: 3 mm anterolisthesis.  MRI of the lumbar spine without contrast 2/15/2023: transitional lumbosacral vertebrae with sacralization of L5.  Mild dextroscoliosis with apex at L2-L3.  The conus is seen at T12 and has unremarkable appearance.    T12-L1, L1-L2: No significant canal or foraminal stenosis  L2-L3: Bilateral spondylolysis of L2 associated with grade 1 anterolisthesis of L2 on L3.  Modic type II endplate changes.  Posterior superior disc pseudo extrusion and posterior annular fissure.  Mild canal stenosis and mild to moderate neuroforaminal stenosis  L3-L4: The pedicles are shorter than average.  Slight retrolisthesis.  Disc bulge.  Facet hypertrophy.  Mild canal stenosis and mild foraminal stenosis  L4-L5: Facet hypertrophy, ligamentum flavum hypertrophy, disc desiccation with a annular fissure.  Grade 1 anterolisthesis.  Moderate central canal stenosis, moderate to severe lateral recess stenosis with effacement of the L5 nerve roots.  Mild to moderate left and moderate right neuroforaminal stenosis with effacement of the right L4 nerve root  L5-S1: L5 is partially sacralized.  Rudimentary disc space.    Review of Systems   Musculoskeletal:  Positive for arthralgias, back pain, gait problem and myalgias.         Patient Active Problem List   Diagnosis    Back pain with radiation    Body mass index (BMI) of 23.0-23.9 in adult    Chronic gastric ulcer with hemorrhage    Chronic osteoarthritis    Coronary artery disease    Gastroesophageal reflux disease without esophagitis     History of TIA (transient ischemic attack)    History of upper gastrointestinal bleeding    Hyperlipidemia    Hypertension, benign    IFG (impaired fasting glucose)    Irritability and anger    Bilateral lumbar radiculopathy    Need for vaccination against Streptococcus pneumoniae    Osteoarthritis of both knees    Screening for prostate cancer    Transient ischemic attack    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    DDD (degenerative disc disease), cervical    Cervical spondylosis without myelopathy    Spondylosis of lumbar region without myelopathy or radiculopathy    Degeneration of lumbar or lumbosacral intervertebral disc    Spondylolisthesis of lumbar region    Lumbar stenosis with neurogenic claudication    History of coronary artery stent placement    Chronic anticoagulation       Past Medical History:   Diagnosis Date    Arthritis 2017    Chronic pain disorder 2022    Clotting disorder 2005    Coronary artery disease 2005    DDD (degenerative disc disease), cervical 04/04/2023    Hyperlipidemia 2005    Hypertension 2005    Infectious viral hepatitis childhood    Low back pain 2005    Lumbosacral disc disease 2012    Osteoarthritis     Pulmonary arterial hypertension 2005    Spinal stenosis 2022    TIA (transient ischemic attack) 2006         Past Surgical History:   Procedure Laterality Date    CAROTID STENT  2005    OTHER SURGICAL HISTORY  2011    upper gi bleed cautery    TONSILLECTOMY  1963    TRIGGER POINT INJECTION  3  steroid shots 2012         Family History   Problem Relation Age of Onset    Cancer Mother         skin    Hypertension Mother     Hearing loss Father     Heart disease Father     Kidney disease Father     Coronary artery disease Father     Hypertension Father          Social History     Socioeconomic History    Marital status:    Tobacco Use    Smoking status: Former     Types: Cigars   Vaping Use    Vaping status: Never Used   Substance and Sexual Activity     Alcohol use: Not Currently     Alcohol/week: 7.0 standard drinks of alcohol     Types: 7 Drinks containing 0.5 oz of alcohol per week    Drug use: Never    Sexual activity: Not Currently           Current Outpatient Medications:     acetaminophen (TYLENOL) 650 MG 8 hr tablet, Take  by mouth., Disp: , Rfl:     Alpha Lipoic Acid 200 MG capsule, Take 400 mg by mouth 3 (Three) Times a Day., Disp: 180 capsule, Rfl: 5    aspirin 81 MG EC tablet, Daily., Disp: , Rfl:     B Complex-Folic Acid (Super B Complex Maxi) tablet, Take  by mouth Daily., Disp: , Rfl:     Cholecalciferol 125 MCG (5000 UT) tablet, Take  by mouth Daily., Disp: , Rfl:     clopidogrel (PLAVIX) 75 MG tablet, , Disp: , Rfl:     coenzyme Q10 100 MG capsule, Take  by mouth Daily., Disp: , Rfl:     ezetimibe (ZETIA) 10 MG tablet, , Disp: , Rfl:     Gel Base gel, 2 g 4 (Four) Times a Day. prilocaine 2%, lidocaine 10%, imipramine 3%, capsaicin 0.001% and mannitol 20%, Disp: 240 g, Rfl: 5    Glucosamine-Vitamin D (Glucosamine Plus Vitamin D) 1500-200 MG-UNIT tablet, Take  by mouth Daily., Disp: , Rfl:     irbesartan (AVAPRO) 300 MG tablet, irbesartan 300 mg tablet, Disp: , Rfl:     lansoprazole (PREVACID) 15 MG capsule, , Disp: , Rfl:     methylcellulose, Laxative, (CITRUCEL) 500 MG tablet tablet, Take 2 tablets by mouth Every 4 (Four) Hours As Needed., Disp: , Rfl:     metoprolol succinate XL (TOPROL-XL) 50 MG 24 hr tablet, Take 1 tablet by mouth Daily., Disp: , Rfl:     Omega-3 Fatty Acids (fish oil) 1000 MG capsule capsule, Take  by mouth Daily With Breakfast., Disp: , Rfl:     rosuvastatin (CRESTOR) 40 MG tablet, Take 1 tablet by mouth Daily., Disp: , Rfl:     Turmeric (QC TUMERIC COMPLEX PO), Take 500 mg by mouth 2 (Two) Times a Day., Disp: , Rfl:     Ubiquinol 100 MG capsule, Take 1 capsule every day by oral route., Disp: , Rfl:     tiZANidine (ZANAFLEX) 2 MG tablet, Take 1 tablet by mouth Every 8 (Eight) Hours As Needed for Muscle Spasms. Start 1/2 tab  "QHS, then continue 1/2 tab to 1 tab TID PRN muscle spasms, Disp: 90 tablet, Rfl: 0      No Known Allergies      Ht 180.3 cm (70.98\")   BMI 24.29 kg/m²       Physical Exam:  Constitutional: Patient appears well-developed, well-nourished, well-hydrated, appears younger than stated age  HEENT: Head: Normocephalic and atraumatic  Eyes: Conjunctivae and lids are normal  Pupils: Equal, round, reactive to light  Peripheral vascular exam: Femoral: right 2+, left 2+. Posterior tibialis: right 2+ and left 2+. Dorsalis pedis: right 2+ and left 2+. No edema.   Musculoskeletal   Gait and station: Gait evaluation demonstrated a normal gait. Able to walk on heels, toes, tandem walking   Lumbar spine: Passive and active range of motion are limited but without significant pain. Extension, flexion, lateral flexion, rotation of the lumbar spine did not increase or reproduce pain. Lumbar facet joint loading maneuvers are negative a this time.  Jhonny test and Gaenslen's test are negative   Piriformis maneuvers are negative   Hip joints: The range of motion of the hip joints is almost full and without pain   Palpation of the bilateral greater trochanter, unrevealing   Examination of the Iliotibial band: unrevealing   Neurological:   Patient is alert and oriented to person, place, and time.   Speech: Normal.   Cortical function: Normal mental status.   Cranial nerves 2-12: intact.   Reflex Scores:  Right brachioradialis: 2+  Left brachioradialis: 2+  Right biceps: 2+  Left biceps: 2+  Right triceps: 2+  Left triceps: 2+  Right patellar: 2+  Left patellar: 2+  Right Achilles: 2+  Left Achilles: 2+  Motor strength: 5/5  Motor Tone: Normal  Involuntary movements: None.   Superficial/Primitive Reflexes: Primitive reflexes were absent.   Right Cervantes: Absent  Left Cervantes: Absent  Right ankle clonus: Absent  Left ankle clonus: Absent   Babinsky: Absent  Long tract signs: Negative. Straight leg raising test: Negative. Femoral stretch sign: " Negative.   Sensory exam: Intact to light touch, intact pain and temperature sensation, intact vibration sensation and normal proprioception.   Coordination: Normal finger to nose and heel to shin. Normal balance. Romberg's sign: Negative   Skin and subcutaneous tissue: Skin is warm and intact. No rash noted. No cyanosis.   Psychiatric: Judgment and insight: Normal. Recent and remote memory: Intact. Mood and affect: Normal.           ASSESSMENT:   1. Lumbar stenosis with neurogenic claudication    2. Spondylolisthesis of lumbar region    3. Cervical spondylosis without myelopathy    4. 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 Chronic anticoagulation    6. DDD (degenerative disc disease), cervical                  PLAN/MEDICAL DECISION MAKING:  Mr. Levi Simons, 70 y.o. male reports a longstanding history of greater than 10 years of chronic lower back and lower extremity pain.  Over the years, he has been treated with conservative measures such as oral analgesics, tramadol, massage therapy, physical therapy without significant improvement.  He had some injections in [place] about 10 years ago with some relief.  Around November 2022, he began to feel pain radiating into his calves, he was prescribed baclofen, oral steroids and physical therapy.  Unfortunately continued to struggle, on 2/15/2023 he underwent MRI of the lumbar spine which demonstrated transitional lumbosacral anatomy with sacralization of the L5 vertebral body.  There is multilevel degenerative changes with multilevel spinal canal, lateral recess and neuroforaminal stenosis.  At L2-L3 there is spondylosis with with a grade 1 anterior listhesis of L2 on L3, Modic type II endplate changes with a posterior disc extrusion and annular fissure.  There is mild central spinal stenosis with moderate neuroforaminal stenosis.  At L4-L5 there is facet hypertrophy with ligamentum flavum thickening and disc desiccation with annular  fissure, a grade 1 anterior listhesis with moderate central spinal stenosis and severe lateral recess stenosis with effacement of the L5 nerve roots.  He has underwent neurosurgical consultation with Dr. Stephen Ferraro on 3/13/2023 and was found not to be surgical candidate, as there is not a simple solution or fix to patient's multilevel issues. MRI of the cervical spine without contrast on 04/03/2023 revealed diffuse cervical spondylosis with disc osteophyte complexes, mild listhesis, and facet hypertrophy contributing to multilevel foraminal stenosis but without significant canal stenosis. He presents with significant comorbidities particularly coronary artery disease s/p stent placement, on Plavix and aspirin. Patient has failed to obtain pain relief with conservative measures including oral analgesics, topical analgesics, ice, heat, TENs, physical therapy (ongoing), physical therapist directed home exercise program HEP (ongoing), independent exercise program), to name a few. I have reviewed all available patient's medical records as well as previous therapies as referenced above. I had a lengthy conversation with Mr. Levi Simons regarding his chronic pain condition and potential therapeutic options including risks, benefits, alternative therapies, to name a few.  Therefore, I have proposed the following plan:  1. Interventional pain management measures: Patient will need to stop clopidogrel (Plavix) at least 7 days between last dose and procedure (Dr. Durham) to proceed with bilateral L4-L5 transforaminal epidural steroid injections using the lowest effective dose of steroids, under C-arm fluoroscopic guidance, with the use of contrast dye (unless contraindicated) to confirm appropriate needle placement and spread of contrast dye. We may repeat therapeutic bilateral L4-L5 transforaminal epidural steroid injections depending on patient's outcome.  I will see him back in about 3 and half months after.   As per current guidelines, epidurals will be limited to a maximum of 4 sessions per spinal region in a rolling twelve (12) month period. Continuation of epidural steroid injections over 12 months would only be considered under the following provisions;  Patient is a high-risk surgical candidate, or the patient does not desire surgery, or recurrence of pain in the same location relieved with ESIs for at least three months and epidural provides at least 50% sustained improvement of pain and/or 50% objective improvement in function (using same scale as baseline)  Pain is severe enough to cause a significant degree of functional disability or vocational disability  The primary care provider will be notified regarding continuation of procedures and repeat steroid use   Patient is not interested in surgical options.  In addition, he has been found not to be a surgical candidate at this time.   Therefore if he continues to struggle, and experiences nonsustained relief with minimally invasive interventional pain management measures, I would first order a new MRI of the lumbar spine to evaluate what further options we may offer him.   2. Diagnostic studies:  A. Depending on continued response we may order EMG/NCV of the bilateral lower extremities   B. CBC, PT, PTT prior to SCS trial  3. Pharmacological measures: Reviewed and discussed;   A. Patient takes Tylenol, baclofen, ibuprofen,    B. Continue alpha lipoid acid 9164-4990 mg per day divided into 3 doses  C.  Trial of tizanidine 2 mg 3 times per day, do not take other muscle relaxants  4. Long-term rehabilitation efforts:  A. The patient does not have a history of falls. I did complete a risk assessment for falls.   B. Patient will start a comprehensive physical therapy program for Alter-G, core strengthening, gait and balance training, neurodynamics, ASTYM, E-STIM, myofascial release, cupping, dry needling, home exercise program  C. Start an exercise program such as  water therapy and swimming  D. Contrast therapy: Apply ice-packs for 15-20 minutes, followed by heating pads for 15-20 minutes to affected area   E. Patient will need a referral to Dr. Christos Wiseman for psychological screening prior to consideration of spinal cord stimulation and intrathecal therapies.  F. Continue Pilates  5. The patient has been instructed to contact my office with any questions or difficulties. The patient understands the plan and agrees to proceed accordingly.        Pain Medications              acetaminophen (TYLENOL) 650 MG 8 hr tablet Take  by mouth.    aspirin 81 MG EC tablet Daily.    tiZANidine (ZANAFLEX) 2 MG tablet Take 1 tablet by mouth Every 8 (Eight) Hours As Needed for Muscle Spasms. Start 1/2 tab QHS, then continue 1/2 tab to 1 tab TID PRN muscle spasms             Orders Placed This Encounter   Procedures    External Facility Surgical/Procedural Request     Standing Status:   Future     Expected Date:   4/2/2025     Expiration Date:   4/2/2026     Provider:   HANK REDDY [[id number]]     Requested Location:   [address] [place] [address]     Procedure/ Order for Consent:   bilateral L4-L5 transforaminal epidural steroid injections     Laterality:   Bilateral     Anesthesia type:   Local [120]     Pre-op diagnosis:   Lumbar stenosis with neurogenic claudication      Please note that portions of this note were completed with a voice recognition program.     EMY Barclay    Patient Care Team:  Vikki Navarrete MD as PCP - General (Family Medicine)  Hank Reddy MD as Consulting Physician (Pain Medicine)  Cherie Hoover APRN as Nurse Practitioner (Nurse Practitioner)  Keven Durham MD as Consulting Physician (Cardiology)     New Medications Ordered This Visit   Medications    tiZANidine (ZANAFLEX) 2 MG tablet     Sig: Take 1 tablet by mouth Every 8 (Eight) Hours As Needed for Muscle Spasms. Start 1/2 tab QHS, then continue 1/2 tab to 1 tab TID PRN muscle spasms      Dispense:  90 tablet     Refill:  0         No future appointments.

## 2025-04-02 ENCOUNTER — OFFICE VISIT (OUTPATIENT)
Dept: PAIN MEDICINE | Facility: CLINIC | Age: 70
End: 2025-04-02
Payer: MEDICARE

## 2025-04-02 VITALS — HEIGHT: 71 IN | BODY MASS INDEX: 24.29 KG/M2

## 2025-04-02 DIAGNOSIS — M47.812 CERVICAL SPONDYLOSIS WITHOUT MYELOPATHY: ICD-10-CM

## 2025-04-02 DIAGNOSIS — Z79.01 CHRONIC ANTICOAGULATION: ICD-10-CM

## 2025-04-02 DIAGNOSIS — M43.16 SPONDYLOLISTHESIS OF LUMBAR REGION: ICD-10-CM

## 2025-04-02 DIAGNOSIS — M48.062 LUMBAR STENOSIS WITH NEUROGENIC CLAUDICATION: ICD-10-CM

## 2025-04-02 DIAGNOSIS — M50.30 DDD (DEGENERATIVE DISC DISEASE), CERVICAL: ICD-10-CM

## 2025-04-02 DIAGNOSIS — M99.41 CONNECTIVE TISSUE STENOSIS OF NEURAL CANAL OF CERVICAL REGION: ICD-10-CM

## 2025-04-02 RX ORDER — TIZANIDINE 2 MG/1
2 TABLET ORAL EVERY 8 HOURS PRN
Qty: 90 TABLET | Refills: 0 | Status: SHIPPED | OUTPATIENT
Start: 2025-04-02

## 2025-04-09 ENCOUNTER — OUTSIDE FACILITY SERVICE (OUTPATIENT)
Dept: PAIN MEDICINE | Facility: CLINIC | Age: 70
End: 2025-04-09
Payer: MEDICARE

## 2025-04-09 PROCEDURE — 64483 NJX AA&/STRD TFRM EPI L/S 1: CPT | Performed by: ANESTHESIOLOGY

## 2025-04-14 ENCOUNTER — TELEPHONE (OUTPATIENT)
Dept: PAIN MEDICINE | Facility: CLINIC | Age: 70
End: 2025-04-14
Payer: MEDICARE

## 2025-05-01 RX ORDER — TIZANIDINE 2 MG/1
TABLET ORAL
Qty: 90 TABLET | Refills: 0 | Status: SHIPPED | OUTPATIENT
Start: 2025-05-01

## 2025-06-02 RX ORDER — TIZANIDINE 2 MG/1
TABLET ORAL
Qty: 90 TABLET | Refills: 0 | Status: SHIPPED | OUTPATIENT
Start: 2025-06-02

## 2025-07-08 ENCOUNTER — OFFICE VISIT (OUTPATIENT)
Dept: PAIN MEDICINE | Facility: CLINIC | Age: 70
End: 2025-07-08
Payer: MEDICARE

## 2025-07-08 VITALS — WEIGHT: 177 LBS | HEIGHT: 71 IN | BODY MASS INDEX: 24.78 KG/M2

## 2025-07-08 DIAGNOSIS — M43.16 SPONDYLOLISTHESIS OF LUMBAR REGION: ICD-10-CM

## 2025-07-08 DIAGNOSIS — M47.812 CERVICAL SPONDYLOSIS WITHOUT MYELOPATHY: ICD-10-CM

## 2025-07-08 DIAGNOSIS — M50.30 DDD (DEGENERATIVE DISC DISEASE), CERVICAL: ICD-10-CM

## 2025-07-08 DIAGNOSIS — M48.062 LUMBAR STENOSIS WITH NEUROGENIC CLAUDICATION: ICD-10-CM

## 2025-07-08 DIAGNOSIS — Z79.01 CHRONIC ANTICOAGULATION: ICD-10-CM

## 2025-07-08 PROCEDURE — 99214 OFFICE O/P EST MOD 30 MIN: CPT | Performed by: NURSE PRACTITIONER

## 2025-07-08 PROCEDURE — 1160F RVW MEDS BY RX/DR IN RCRD: CPT | Performed by: NURSE PRACTITIONER

## 2025-07-08 PROCEDURE — 1159F MED LIST DOCD IN RCRD: CPT | Performed by: NURSE PRACTITIONER

## 2025-07-08 PROCEDURE — 1125F AMNT PAIN NOTED PAIN PRSNT: CPT | Performed by: NURSE PRACTITIONER

## 2025-07-08 RX ORDER — TIZANIDINE 2 MG/1
TABLET ORAL
Qty: 90 TABLET | Refills: 0 | Status: SHIPPED | OUTPATIENT
Start: 2025-07-08

## 2025-07-08 NOTE — PROGRESS NOTES
"Chief Complaint: \"Lower back and leg pain.\"         History of Present Illness:   Patient: Mr. Levi Simons, 70 y.o. male originally referred by Dr. Stephen Ferraro in consultation for chronic intractable lower back and lower extremity pain.  Patient reports a longstanding history of greater than 10 years of chronic lower back and lower extremity pain.  Over the years, he has been treated with conservative measures such as oral analgesics, tramadol, massage therapy, physical therapy without significant improvement.  He had some injections in [place] about 10 years ago with some relief.  Around November 2022, he began to feel pain radiating into his calves, he was prescribed baclofen, oral steroids and physical therapy.  Unfortunately he continued to struggle, on 2/15/2023 he underwent MRI of the lumbar spine which demonstrated transitional lumbosacral anatomy with sacralization of the L5 vertebral body.  There is multilevel degenerative changes with multilevel spinal canal, lateral recess and neuroforaminal stenosis.  At L2-L3 there is spondylosis with with a grade 1 anterior listhesis of L2 on L3, Modic type II endplate changes with a posterior disc extrusion and annular fissure.  There is mild central spinal stenosis with moderate neuroforaminal stenosis.  At L4-L5 there is facet hypertrophy with ligamentum flavum thickening and disc desiccation with annular fissure, a grade 1 anterior listhesis with moderate central spinal stenosis and severe lateral recess stenosis with effacement of the L5 nerve roots.  He has underwent neurosurgical consultation with Dr. Stephen Ferraro on 3/13/2023 and was found not to be surgical candidate, as there is not a simple solution or fix to patient's multilevel issues.  We last saw him on 4/9/2025, when he underwent a therapeutic bilateral L4-L5 transforaminal epidural steroid injection, for which she reports experiencing almost 100% pain relief and functional improvement " "lasting 3 months, over the last 2 weeks, he has felt more sensations radiating into his calves, which is normal for him. He does remain quite active, engaging in Pilates, daily walks, etc. He has continued a physical therapist directed home exercise program.  He returns today for postprocedure follow-up and evaluation.  Pain Description: Constant lower back pain with intermittent exacerbation, described as aching, burning, dull, sharp, shooting, stabbing and throbbing sensation.   Radiation of Pain: The pain radiates from the back to the gluteal region and into his calves  Pain intensity today: 4/10   Average pain intensity last week: 4/10  Pain intensity ranges from: 3/10 to 5/10  Aggravating factors: Pain increases with bending, standing, walking, sitting on hard surfaces, protracted sitting.    Alleviating factors: Pain decreases with sitting down, lying down, heating pads  Associated Symptoms:   Patient reports pain, numbness, and weakness in the lower extremities RT>LT.   Patient denies any new bladder or bowel problems.   Patient denies difficulties with his balance or recent falls.   Pain does not interfere with ADLs, but interferes with general activities and affects patient's quality of life  Pain does not interfere with sleep causing sleep fragmentation   Stiffness    Review of previous therapies and additional medical records:  Levi Simons has already failed the following measures, including:   Conservative Measures: Oral analgesics, topical analgesics, ice, heat, TENs, physical therapy (ongoing), physical therapist directed home exercise program HEP (ongoing), independent exercise program, to name a few  Interventional Measures: In 2012 with \"3 lumbar injections\" by amrit Swan in [place] with significant relief for about 10 years  04/12/2023: DxTx bilateral L4-L5 transforaminal epidural steroid injections  07/26/2023: bilateral L4-L5 transforaminal epidural steroid injections  11/08/2023: Tx bilateral " L4-L5 transforaminal epidural steroid injections  05/08/2024: Tx bilateral L4-L5 transforaminal epidural steroid injections  09/23/2024: Tx bilateral L4-L5 transforaminal epidural steroid injections  12/30/2024: Tx bilateral L4-L5 transforaminal epidural steroid injections  04/09/2025: Tx bilateral L4-L5 transforaminal epidural steroid injections  Surgical Measures: No history of previous cervical spine, lumbar spine or hip surgery   Levi Simons underwent neurosurgical consultation with Dr. Stephen Ferraro on 3/13/2023 and was found not to be a surgical candidate.  Levi Simons presents with significant comorbidities including coronary artery disease s/p stent placement, on aspirin and Plavix, hyperlipidemia, hypertension, history of TIA, GERD, tobacco user, irritability and anger  In terms of current analgesics, Levi Simons takes: Tylenol, baclofen, ibuprofen, tramadol  I have reviewed Jeff Report consistent with medication reconciliation.  SOAPP: Low Risk         Global Pain Scale 04-04  2023 07-19  2023 10-23  2023 04-15  2024 08-26  2024 12-18  2024 04-02  2025 07-08  2025    Pain 16.5 8 7 8 8 6 8 10    Feelings 2 4 3 2 2 1 1 2    Clinical outcomes 8.5 5 4 3 3 2 4 2    Activities 2.5 5 6 2 3 3 4 2    GPS Total: 21 22 20 15 16 12 17 16      The [place] Back Pain Disability Scale   DATE 04-04  2023 07-19  2023 10-23  2023 08-26  2024 12-18  2024 04-02  2025 07-08  2025    Sleep through the night 2 2 1 1 1 1 1    Turn over in bed 1 2 0 1 1 0 0    Get out of bed 1 2 1 1 2 1 0    Make your bed 1 1 0 0 0 0 0    Put on socks (pantyhose) 1 2 1 1 1 1 0    Ride in a car 3 2 1 2 3 2 2    Sit in a chair for several hours 3 2 2 3 3 3 2    Stand up for 20-30 minutes 0 0 0 0 1 1 0    Climb one flight of stairs 0 0 0 0 0 0 0    Walk a few blocks (200-300 yards)  1 1 0 0 0 0 0    Walk several miles 1 1 1 2 1 2 2    Run one block (about 50 yards) 3 0 3 3 1 3 3    Take food out of the refrigerator 0 0 0 0 0 0 0     Reach up to high shelves 0 1 1 1 0 0 0    Move a chair 1 0 0 0 0 0 0    Pull or push heavy doors 1 1 1 1 1 0 0    Bend over to clean the bathtub 3 2 1 1 1 0 1    Throw a ball 1 0 1 1 1 1 0    Carry two bags of groceries 1 1 0 0 0 0 0    Lift and carry a heavy suitcase 3 2 1 2 1 1 1    Total score 27 22 15 20 18 16 12      Review of Diagnostic Studies:  MRI of the thoracic spine without contrast 5/9/2023: Per radiology report no imaging is for review.  Mild thoracic levoscoliosis.  Mild spondylosis with anterior spurs at most levels, facet hypertrophy.  Spinal canal lipomatosis mainly at the T10 and T11 level which results in mild narrowing of the thoracic thecal sac.  No high-grade narrowing of the neuroforamen or spinal canal.  Normal signal seen within the thoracic spinal cord.  Lumbar spine x-rays with flexion-extension views 4/4/2023: 6 mm grade 1 anterior listhesis of L2 on L3 on flexion with 5 mm on extension.  L2 spondylolysis.  Grade 1 anterior listhesis of L4 and L5 with no difference in flexion-extension views.  Multilevel moderate degenerative changes.  Transitional lumbosacral right L5.  Cervical spine x-rays with flexion-extension views 4/4/2023: Grade 1 anterior listhesis of C4 on C5 without instability.  Moderate degenerative changes seen diffusely throughout with disc base narrowing, osteophytes and facet arthritis most significant in the lower cervical spine.  MRI of the cervical spine without contrast 4/3/2023: diffuse cervical spondylosis.    C2-C3: Mild to moderate left foraminal stenosis  C3-C4: Disc osteophyte complex, facet hypertrophy.  Modic type I changes.  Severe left foraminal stenosis.  C4-C5: There is osteophyte complex, facet hypertrophy.  Severe left foraminal stenosis  C5-C6: 2 mm retrolisthesis, Modic type I endplate changes.  Disc osteophyte complex with mild right cord abutment.  Severe right foraminal stenosis.  Mild left foraminal stenosis  C6-C7: Facet hypertrophy, disc  osteophyte complex, Modic type I changes.  Mild central canal stenosis.  Moderate to severe bilateral foraminal stenosis  C7-T1: 3 mm anterolisthesis.  MRI of the lumbar spine without contrast 2/15/2023: transitional lumbosacral vertebrae with sacralization of L5.  Mild dextroscoliosis with apex at L2-L3.  The conus is seen at T12 and has unremarkable appearance.    T12-L1, L1-L2: No significant canal or foraminal stenosis  L2-L3: Bilateral spondylolysis of L2 associated with grade 1 anterolisthesis of L2 on L3.  Modic type II endplate changes.  Posterior superior disc pseudo extrusion and posterior annular fissure.  Mild canal stenosis and mild to moderate neuroforaminal stenosis  L3-L4: The pedicles are shorter than average.  Slight retrolisthesis.  Disc bulge.  Facet hypertrophy.  Mild canal stenosis and mild foraminal stenosis  L4-L5: Facet hypertrophy, ligamentum flavum hypertrophy, disc desiccation with a annular fissure.  Grade 1 anterolisthesis.  Moderate central canal stenosis, moderate to severe lateral recess stenosis with effacement of the L5 nerve roots.  Mild to moderate left and moderate right neuroforaminal stenosis with effacement of the right L4 nerve root  L5-S1: L5 is partially sacralized.  Rudimentary disc space.    Review of Systems   Musculoskeletal:  Positive for arthralgias, back pain, gait problem and myalgias.         Patient Active Problem List   Diagnosis    Back pain with radiation    Body mass index (BMI) of 23.0-23.9 in adult    Chronic gastric ulcer with hemorrhage    Chronic osteoarthritis    Coronary artery disease    Gastroesophageal reflux disease without esophagitis    History of TIA (transient ischemic attack)    History of upper gastrointestinal bleeding    Hyperlipidemia    Hypertension, benign    IFG (impaired fasting glucose)    Irritability and anger    Bilateral lumbar radiculopathy    Need for vaccination against Streptococcus pneumoniae    Osteoarthritis of both knees     Screening for prostate cancer    Transient ischemic attack    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    DDD (degenerative disc disease), cervical    Cervical spondylosis without myelopathy    Spondylosis of lumbar region without myelopathy or radiculopathy    Degeneration of lumbar or lumbosacral intervertebral disc    Spondylolisthesis of lumbar region    Lumbar stenosis with neurogenic claudication    History of coronary artery stent placement    Chronic anticoagulation       Past Medical History:   Diagnosis Date    Arthritis 2017    Chronic pain disorder 2022    Clotting disorder 2005    Coronary artery disease 2005    DDD (degenerative disc disease), cervical 04/04/2023    Hyperlipidemia 2005    Hypertension 2005    Infectious viral hepatitis childhood    Low back pain 2005    Lumbosacral disc disease 2012    Osteoarthritis     Pulmonary arterial hypertension 2005    Spinal stenosis 2022    TIA (transient ischemic attack) 2006         Past Surgical History:   Procedure Laterality Date    CAROTID STENT  2005    OTHER SURGICAL HISTORY  2011    upper gi bleed cautery    TONSILLECTOMY  1963    TRIGGER POINT INJECTION  3  steroid shots 2012         Family History   Problem Relation Age of Onset    Cancer Mother         skin    Hypertension Mother     Hearing loss Father     Heart disease Father     Kidney disease Father     Coronary artery disease Father     Hypertension Father          Social History     Socioeconomic History    Marital status:    Tobacco Use    Smoking status: Former     Types: Cigars    Smokeless tobacco: Never   Vaping Use    Vaping status: Never Used   Substance and Sexual Activity    Alcohol use: Not Currently     Alcohol/week: 7.0 standard drinks of alcohol     Types: 7 Drinks containing 0.5 oz of alcohol per week    Drug use: Never    Sexual activity: Not Currently           Current Outpatient Medications:     Alpha Lipoic Acid 200 MG capsule, Take 400 mg by  "mouth 3 (Three) Times a Day., Disp: 180 capsule, Rfl: 5    aspirin 81 MG EC tablet, Daily., Disp: , Rfl:     B Complex-Folic Acid (Super B Complex Maxi) tablet, Take  by mouth Daily., Disp: , Rfl:     Cholecalciferol 125 MCG (5000 UT) tablet, Take  by mouth Daily., Disp: , Rfl:     clopidogrel (PLAVIX) 75 MG tablet, , Disp: , Rfl:     coenzyme Q10 100 MG capsule, Take  by mouth Daily., Disp: , Rfl:     ezetimibe (ZETIA) 10 MG tablet, , Disp: , Rfl:     Gel Base gel, 2 g 4 (Four) Times a Day. prilocaine 2%, lidocaine 10%, imipramine 3%, capsaicin 0.001% and mannitol 20%, Disp: 240 g, Rfl: 5    Glucosamine-Vitamin D (Glucosamine Plus Vitamin D) 1500-200 MG-UNIT tablet, Take  by mouth Daily., Disp: , Rfl:     irbesartan (AVAPRO) 300 MG tablet, irbesartan 300 mg tablet, Disp: , Rfl:     lansoprazole (PREVACID) 15 MG capsule, , Disp: , Rfl:     methylcellulose, Laxative, (CITRUCEL) 500 MG tablet tablet, Take 2 tablets by mouth Every 4 (Four) Hours As Needed., Disp: , Rfl:     metoprolol succinate XL (TOPROL-XL) 50 MG 24 hr tablet, Take 1 tablet by mouth Daily., Disp: , Rfl:     Omega-3 Fatty Acids (fish oil) 1000 MG capsule capsule, Take  by mouth Daily With Breakfast., Disp: , Rfl:     rosuvastatin (CRESTOR) 40 MG tablet, Take 1 tablet by mouth Daily., Disp: , Rfl:     tiZANidine (ZANAFLEX) 2 MG tablet, START TAKING 1/2 TABLET BY MOUTH EVERY NIGHT AT BEDTIME, THEN CONTINUE 1/2 TO 1 TABLET BY MOUTH THREE TIMES DAILY AS NEEDED FOR MUSCLE SPASMS, Disp: 90 tablet, Rfl: 0    Turmeric (QC TUMERIC COMPLEX PO), Take 500 mg by mouth 2 (Two) Times a Day., Disp: , Rfl:     acetaminophen (TYLENOL) 650 MG 8 hr tablet, Take  by mouth., Disp: , Rfl:     Ubiquinol 100 MG capsule, Take 1 capsule every day by oral route., Disp: , Rfl:       No Known Allergies      Ht 180.3 cm (70.98\")   Wt 80.3 kg (177 lb)   BMI 24.70 kg/m²       Physical Exam:  Constitutional: Patient appears well-developed, well-nourished, well-hydrated, appears " younger than stated age  HEENT: Head: Normocephalic and atraumatic  Eyes: Conjunctivae and lids are normal  Pupils: Equal, round, reactive to light  Peripheral vascular exam: Femoral: right 2+, left 2+. Posterior tibialis: right 2+ and left 2+. Dorsalis pedis: right 2+ and left 2+. No edema.   Musculoskeletal   Gait and station: Gait evaluation demonstrated a normal gait. Able to walk on heels, toes, tandem walking   Lumbar spine: Passive and active range of motion are limited but without significant pain. Extension, flexion, lateral flexion, rotation of the lumbar spine did not increase or reproduce pain. Lumbar facet joint loading maneuvers are negative a this time.  Jhonny test and Gaenslen's test are negative   Piriformis maneuvers are negative   Hip joints: The range of motion of the hip joints is almost full and without pain   Palpation of the bilateral greater trochanter, unrevealing   Examination of the Iliotibial band: unrevealing   Neurological:   Patient is alert and oriented to person, place, and time.   Speech: Normal.   Cortical function: Normal mental status.   Cranial nerves 2-12: intact.   Reflex Scores:  Right brachioradialis: 2+  Left brachioradialis: 2+  Right biceps: 2+  Left biceps: 2+  Right triceps: 2+  Left triceps: 2+  Right patellar: 2+  Left patellar: 2+  Right Achilles: 2+  Left Achilles: 2+  Motor strength: 5/5  Motor Tone: Normal  Involuntary movements: None.   Superficial/Primitive Reflexes: Primitive reflexes were absent.   Right Cervantes: Absent  Left Cervantes: Absent  Right ankle clonus: Absent  Left ankle clonus: Absent   Babinsky: Absent  Long tract signs: Negative. Straight leg raising test: Negative. Femoral stretch sign: Negative.   Sensory exam: Intact to light touch, intact pain and temperature sensation, intact vibration sensation and normal proprioception.   Coordination: Normal finger to nose and heel to shin. Normal balance. Romberg's sign: Negative   Skin and  subcutaneous tissue: Skin is warm and intact. No rash noted. No cyanosis.   Psychiatric: Judgment and insight: Normal. Recent and remote memory: Intact. Mood and affect: Normal.           ASSESSMENT:   1. Lumbar stenosis with neurogenic claudication    2. Spondylolisthesis of lumbar region    3. Cervical spondylosis without myelopathy    4. DDD (degenerative disc disease), cervical    5. Chronic anticoagulation                    PLAN/MEDICAL DECISION MAKING:  Mr. Levi Simons, 70 y.o. male reports a longstanding history of greater than 10 years of chronic lower back and lower extremity pain.  Over the years, he has been treated with conservative measures such as oral analgesics, tramadol, massage therapy, physical therapy without significant improvement.  He had some injections in [place] about 10 years ago with some relief.  Around November 2022, he began to feel pain radiating into his calves, he was prescribed baclofen, oral steroids and physical therapy.  Unfortunately continued to struggle, on 2/15/2023 he underwent MRI of the lumbar spine which demonstrated transitional lumbosacral anatomy with sacralization of the L5 vertebral body.  There is multilevel degenerative changes with multilevel spinal canal, lateral recess and neuroforaminal stenosis.  At L2-L3 there is spondylosis with with a grade 1 anterior listhesis of L2 on L3, Modic type II endplate changes with a posterior disc extrusion and annular fissure.  There is mild central spinal stenosis with moderate neuroforaminal stenosis.  At L4-L5 there is facet hypertrophy with ligamentum flavum thickening and disc desiccation with annular fissure, a grade 1 anterior listhesis with moderate central spinal stenosis and severe lateral recess stenosis with effacement of the L5 nerve roots.  He has underwent neurosurgical consultation with Dr. Stephen Ferraro on 3/13/2023 and was found not to be surgical candidate, as there is not a simple solution or fix to  patient's multilevel issues. MRI of the cervical spine without contrast on 04/03/2023 revealed diffuse cervical spondylosis with disc osteophyte complexes, mild listhesis, and facet hypertrophy contributing to multilevel foraminal stenosis but without significant canal stenosis. He presents with significant comorbidities particularly coronary artery disease s/p stent placement, on Plavix and aspirin. Patient has failed to obtain pain relief with conservative measures including oral analgesics, topical analgesics, ice, heat, TENs, physical therapy (ongoing), physical therapist directed home exercise program HEP (ongoing), independent exercise program), to name a few. I have reviewed all available patient's medical records as well as previous therapies as referenced above. I had a lengthy conversation with Mr. Levi Simons regarding his chronic pain condition and potential therapeutic options including risks, benefits, alternative therapies, to name a few.  Therefore, I have proposed the following plan:  1. Interventional pain management measures: Patient will need to stop clopidogrel (Plavix) at least 7 days between last dose and procedure (Dr. Durham) to proceed with bilateral L4-L5 transforaminal epidural steroid injections using the lowest effective dose of steroids, under C-arm fluoroscopic guidance, with the use of contrast dye (unless contraindicated) to confirm appropriate needle placement and spread of contrast dye. We may repeat therapeutic bilateral L4-L5 transforaminal epidural steroid injections depending on patient's outcome.  I will see him back in about 3 and half months after.  As per current guidelines, epidurals will be limited to a maximum of 4 sessions per spinal region in a rolling twelve (12) month period. Continuation of epidural steroid injections over 12 months would only be considered under the following provisions;  Patient is a high-risk surgical candidate, or the patient does not  desire surgery, or recurrence of pain in the same location relieved with ESIs for at least three months and epidural provides at least 50% sustained improvement of pain and/or 50% objective improvement in function (using same scale as baseline)  Pain is severe enough to cause a significant degree of functional disability or vocational disability  The primary care provider will be notified regarding continuation of procedures and repeat steroid use   Patient is not interested in surgical options.  In addition, he has been found not to be a surgical candidate at this time.   Therefore if he continues to struggle, and experiences nonsustained relief with minimally invasive interventional pain management measures, I would first order a new MRI of the lumbar spine to evaluate what further options we may offer him.   2. Diagnostic studies:  A. Depending on continued response we may order EMG/NCV of the bilateral lower extremities   B. CBC, PT, PTT prior to SCS trial  3. Pharmacological measures: Reviewed and discussed;   A. Patient takes Tylenol, baclofen, ibuprofen,    B. Continue alpha lipoid acid 5502-0907 mg per day divided into 3 doses  C. Continue tizanidine 2 mg 3 times per day, do not take other muscle relaxants  4. Long-term rehabilitation efforts:  A. The patient does not have a history of falls. I did complete a risk assessment for falls.   B. Patient will start a comprehensive physical therapy program for Alter-G, core strengthening, gait and balance training, neurodynamics, ASTYM, E-STIM, myofascial release, cupping, dry needling, home exercise program  C. Start an exercise program such as water therapy and swimming  D. Contrast therapy: Apply ice-packs for 15-20 minutes, followed by heating pads for 15-20 minutes to affected area   E. Patient will need a referral to Dr. Christos Wiseman for psychological screening prior to consideration of spinal cord stimulation and intrathecal therapies.  F. Continue  Pilates  5. The patient has been instructed to contact my office with any questions or difficulties. The patient understands the plan and agrees to proceed accordingly.        Pain Medications              aspirin 81 MG EC tablet Daily.    tiZANidine (ZANAFLEX) 2 MG tablet START TAKING 1/2 TABLET BY MOUTH EVERY NIGHT AT BEDTIME, THEN CONTINUE 1/2 TO 1 TABLET BY MOUTH THREE TIMES DAILY AS NEEDED FOR MUSCLE SPASMS    acetaminophen (TYLENOL) 650 MG 8 hr tablet Take  by mouth.             Orders Placed This Encounter   Procedures    External Facility Surgical/Procedural Request     Standing Status:   Future     Expected Date:   7/8/2025     Expiration Date:   7/8/2026     Provider:   HANK REDDY [[id number]]     Requested Location:   [address] [place] [address]     Procedure/ Order for Consent:   bilateral L4-L5 transforaminal epidural steroid injections     Laterality:   Bilateral     Anesthesia type:   Sedation [260]     Pre-op diagnosis:   Lumbar stenosis with neurogenic claudication      Please note that portions of this note were completed with a voice recognition program.     Any copied data in any portion of my note from previous notes included in the HPI, PE, MDM and/or assessment and plan has been reviewed by myself and accurate as of this date.   The 21st Century Cures Act makes medical notes like this available to patients in the interest of transparency. This is a medical document intended as peer to peer communication. It is written in medical language and may contain abbreviations or verbiage that are unfamiliar. It may appear blunt or direct. Medical documents are intended to carry relevant information, facts as evident, and the clinical opinion of the practitioner.      EMY Barclay    Patient Care Team:  Vikki Navarrete MD as PCP - General (Family Medicine)  Hank Reddy MD as Consulting Physician (Pain Medicine)  Cherie Hoover APRN as Nurse Practitioner (Nurse Practitioner)  Marva  Keven Jordan MD as Consulting Physician (Cardiology)     No orders of the defined types were placed in this encounter.        No future appointments.

## 2025-07-17 ENCOUNTER — TELEPHONE (OUTPATIENT)
Dept: PAIN MEDICINE | Facility: CLINIC | Age: 70
End: 2025-07-17
Payer: MEDICARE

## 2025-07-17 NOTE — TELEPHONE ENCOUNTER
Called patient, let him know blood thinner clearance was received. Patient stopped plavix on evening of 7/13/2025

## 2025-07-21 ENCOUNTER — DOCUMENTATION (OUTPATIENT)
Dept: PAIN MEDICINE | Facility: CLINIC | Age: 70
End: 2025-07-21

## 2025-07-21 ENCOUNTER — OUTSIDE FACILITY SERVICE (OUTPATIENT)
Dept: PAIN MEDICINE | Facility: CLINIC | Age: 70
End: 2025-07-21
Payer: MEDICARE

## 2025-07-21 PROCEDURE — 64483 NJX AA&/STRD TFRM EPI L/S 1: CPT | Performed by: ANESTHESIOLOGY

## 2025-07-21 NOTE — PROGRESS NOTES
PROCEDURE DATE: 07/21/2025      PREOPERATIVE DIAGNOSES:  1. Lumbar stenosis with neurogenic claudication   2. Spondylolisthesis of lumbar region   3. Cervical spondylosis without myelopathy   4. DDD (degenerative disc disease), cervical   5. Chronic anticoagulation     POSTOPERATIVE DIAGNOSES:  1. Lumbar stenosis with neurogenic claudication   2. Spondylolisthesis of lumbar region   3. Cervical spondylosis without myelopathy   4. DDD (degenerative disc disease), cervical   5. Chronic anticoagulation     PROCEDURE: Therapeutic bilateral L4-L5 transforaminal epidural steroid injections    ANESTHESIA: Local anesthesia plus IV sedation    COMPLICATIONS: None    EBL: 0    MEDICAL NECESSITY: A comprehensive evaluation, including history and physical exam and pertinent physiological and functional assessment, was performed. The patient presents with intractable pain due to the diagnoses listed above. The patient has failed to respond to conservative modalities including the impact of patient's moderate-to-severe pain contributing to significant impairment in daily activities, ADLs, and a negative impact on quality of life, as referenced under HPI. Supporting diagnostic studies of patient's chronic pain condition have been reviewed. We have discussed using a stepwise approach starting with the shortest or least intense level of treatment, care, or service as determined by the extent required to diagnose and or treat a patient's condition. The treatments proposed are consistent with the patient's medical condition and are known to be as safe and effective by current guidelines and standard of care. See OV note for additional details.     PROCEDURE SUMMARY: After explaining all the risks and benefits of the procedure, an informed consent was obtained.  The patient's surgical site was confirmed with the patient and marked in the holding room accordingly. The patient was transferred to the procedure room and placed on the  operating room table in the prone position. Time out was completed. Non-invasive monitors were applied. Administration of IV sedation was performed by a designated procedure room nurse, who monitored the patient's level of consciousness and physiological status. Pertinent information was reported on a sedation flow sheet, which is part of the patient's permanent medical records. Start sedation time: 07:57 AM . The patient's vital signs remained within normal limits. The lumbar spine area was prepped and draped in a sterile fashion.  Using C-arm fluoroscopic guidance, the six o'clock area of the bilateral L4 pedicles (targets) were identified.  The skin and tissues overlying the targets were anesthetized with five ml of 1% lidocaine. Then, 22-gauge styleted needles were advanced into the most posterior and superior portion of the bilateral L4-L5 neural foramina without any difficulties. The patient did not experience paresthesia. AP and lateral X-ray views were obtained confirming appropriate needle placement. Aspiration was negative for blood and/or CSF. One ml of Omnipaque-240 was injected per side, and contrast dye was seen bathing the bilateral L4 nerve roots with spread into the epidural space. X-rays were obtained and scanned in the patient's chart. Two ml of 0.25% bupivacaine with 4 mg of dexamethasone were injected per side. Fluoroscopy was utilized using low-dose of radiation applying collimation, pulsed mode, and shielding following ALARA recommendations.  Fluoroscopy time: 26 seconds. End sedation time: 08:03 AM . The patient tolerated the procedure well and without incident.  Upon completion of the procedure, the patient was transferred to the recovery room in stable condition. The [facility] was [facility] neurologically intact and with appropriate discharge instructions. Pain level before procedure: 7/10. Pain level after procedure: 3/10.    PLAN:   Follow-up with EMY Brock  in 10 weeks. We may repeat therapeutic     bilateral L4-L5 transforaminal epidural steroid injections depending on the patient's outcome and following current guidelines. Stop clopidogrel (Plavix) at least 7 days between last dose and procedure   The patient has been instructed to contact my office with any questions or difficulties. The patient understands the plan and agrees to proceed accordingly.    Please note that portions of this note were completed with a voice recognition program.         Signatures  Electronically signed by: Ramiro Tomlinson M.D.; JULY 21, 2025, 13:17 EST (Author)

## 2025-07-23 ENCOUNTER — TELEPHONE (OUTPATIENT)
Age: 70
End: 2025-07-23
Payer: MEDICARE

## 2025-07-23 NOTE — TELEPHONE ENCOUNTER
Attempted to contact patient, no answer. LVM to return call to office and provided call back number.      HUB relay:     Schedule appt for a 3.5mnth f/u, post TESI with Cherie at [place].

## 2025-08-13 RX ORDER — TIZANIDINE 2 MG/1
TABLET ORAL
Qty: 90 TABLET | Refills: 0 | Status: SHIPPED | OUTPATIENT
Start: 2025-08-13